# Patient Record
Sex: MALE | Race: WHITE | Employment: FULL TIME | URBAN - METROPOLITAN AREA
[De-identification: names, ages, dates, MRNs, and addresses within clinical notes are randomized per-mention and may not be internally consistent; named-entity substitution may affect disease eponyms.]

---

## 2017-02-10 ENCOUNTER — OFFICE VISIT (OUTPATIENT)
Dept: SLEEP MEDICINE | Age: 52
End: 2017-02-10

## 2017-02-10 VITALS
OXYGEN SATURATION: 98 % | HEIGHT: 74 IN | DIASTOLIC BLOOD PRESSURE: 78 MMHG | HEART RATE: 78 BPM | BODY MASS INDEX: 36.06 KG/M2 | SYSTOLIC BLOOD PRESSURE: 130 MMHG | WEIGHT: 281 LBS

## 2017-02-10 DIAGNOSIS — G47.33 OBSTRUCTIVE SLEEP APNEA SYNDROME: Primary | ICD-10-CM

## 2017-02-10 DIAGNOSIS — E66.9 NON MORBID OBESITY, UNSPECIFIED OBESITY TYPE: Chronic | ICD-10-CM

## 2017-02-10 DIAGNOSIS — I10 ESSENTIAL HYPERTENSION: Chronic | ICD-10-CM

## 2017-02-10 DIAGNOSIS — I48.0 PAROXYSMAL ATRIAL FIBRILLATION (HCC): Chronic | ICD-10-CM

## 2017-02-10 PROCEDURE — 99214 OFFICE O/P EST MOD 30 MIN: CPT | Performed by: NURSE PRACTITIONER

## 2017-02-10 ASSESSMENT — ENCOUNTER SYMPTOMS
RHINORRHEA: 0
SHORTNESS OF BREATH: 0
ABDOMINAL DISTENTION: 0
ABDOMINAL PAIN: 0
COUGH: 0
SINUS PRESSURE: 0
APNEA: 0

## 2017-02-10 ASSESSMENT — SLEEP AND FATIGUE QUESTIONNAIRES
HOW LIKELY ARE YOU TO NOD OFF OR FALL ASLEEP IN A CAR, WHILE STOPPED FOR A FEW MINUTES IN TRAFFIC: 0
ESS TOTAL SCORE: 5
HOW LIKELY ARE YOU TO NOD OFF OR FALL ASLEEP WHILE SITTING QUIETLY AFTER LUNCH WITHOUT ALCOHOL: 1
HOW LIKELY ARE YOU TO NOD OFF OR FALL ASLEEP WHILE LYING DOWN TO REST IN THE AFTERNOON WHEN CIRCUMSTANCES PERMIT: 1
HOW LIKELY ARE YOU TO NOD OFF OR FALL ASLEEP WHEN YOU ARE A PASSENGER IN A CAR FOR AN HOUR WITHOUT A BREAK: 1
HOW LIKELY ARE YOU TO NOD OFF OR FALL ASLEEP WHILE SITTING INACTIVE IN A PUBLIC PLACE: 0
HOW LIKELY ARE YOU TO NOD OFF OR FALL ASLEEP WHILE WATCHING TV: 1
HOW LIKELY ARE YOU TO NOD OFF OR FALL ASLEEP WHILE SITTING AND READING: 1
HOW LIKELY ARE YOU TO NOD OFF OR FALL ASLEEP WHILE SITTING AND TALKING TO SOMEONE: 0

## 2017-02-13 ENCOUNTER — OFFICE VISIT (OUTPATIENT)
Dept: CARDIOLOGY CLINIC | Age: 52
End: 2017-02-13

## 2017-02-13 VITALS
WEIGHT: 283.4 LBS | HEART RATE: 86 BPM | BODY MASS INDEX: 36.37 KG/M2 | OXYGEN SATURATION: 94 % | DIASTOLIC BLOOD PRESSURE: 70 MMHG | SYSTOLIC BLOOD PRESSURE: 128 MMHG | HEIGHT: 74 IN

## 2017-02-13 DIAGNOSIS — I48.0 PAROXYSMAL ATRIAL FIBRILLATION (HCC): Primary | Chronic | ICD-10-CM

## 2017-02-13 DIAGNOSIS — G47.33 OBSTRUCTIVE SLEEP APNEA SYNDROME: ICD-10-CM

## 2017-02-13 DIAGNOSIS — E66.09 OBESITY DUE TO EXCESS CALORIES, UNSPECIFIED OBESITY SEVERITY: ICD-10-CM

## 2017-02-13 DIAGNOSIS — I10 ESSENTIAL HYPERTENSION: ICD-10-CM

## 2017-02-13 PROCEDURE — 99214 OFFICE O/P EST MOD 30 MIN: CPT | Performed by: INTERNAL MEDICINE

## 2017-02-13 PROCEDURE — 93000 ELECTROCARDIOGRAM COMPLETE: CPT | Performed by: INTERNAL MEDICINE

## 2017-03-30 ENCOUNTER — OFFICE VISIT (OUTPATIENT)
Dept: INTERNAL MEDICINE CLINIC | Age: 52
End: 2017-03-30

## 2017-03-30 VITALS
BODY MASS INDEX: 36.32 KG/M2 | HEART RATE: 84 BPM | WEIGHT: 283 LBS | HEIGHT: 74 IN | DIASTOLIC BLOOD PRESSURE: 96 MMHG | SYSTOLIC BLOOD PRESSURE: 154 MMHG

## 2017-03-30 DIAGNOSIS — Z00.00 ROUTINE ADULT HEALTH MAINTENANCE: Primary | ICD-10-CM

## 2017-03-30 DIAGNOSIS — Z23 NEED FOR PROPHYLACTIC VACCINATION AGAINST DIPHTHERIA-TETANUS-PERTUSSIS (DTP): ICD-10-CM

## 2017-03-30 DIAGNOSIS — F41.1 GENERALIZED ANXIETY DISORDER: ICD-10-CM

## 2017-03-30 DIAGNOSIS — Z11.4 SCREENING FOR HIV WITHOUT PRESENCE OF RISK FACTORS: ICD-10-CM

## 2017-03-30 DIAGNOSIS — K63.5 POLYP OF COLON: ICD-10-CM

## 2017-03-30 DIAGNOSIS — I10 ESSENTIAL HYPERTENSION: ICD-10-CM

## 2017-03-30 DIAGNOSIS — Z11.59 NEED FOR HEPATITIS C SCREENING TEST: ICD-10-CM

## 2017-03-30 DIAGNOSIS — D22.9 NUMEROUS MOLES: ICD-10-CM

## 2017-03-30 DIAGNOSIS — G47.33 OBSTRUCTIVE SLEEP APNEA SYNDROME: ICD-10-CM

## 2017-03-30 PROCEDURE — 90471 IMMUNIZATION ADMIN: CPT | Performed by: INTERNAL MEDICINE

## 2017-03-30 PROCEDURE — 99386 PREV VISIT NEW AGE 40-64: CPT | Performed by: INTERNAL MEDICINE

## 2017-03-30 PROCEDURE — 90715 TDAP VACCINE 7 YRS/> IM: CPT | Performed by: INTERNAL MEDICINE

## 2017-03-30 RX ORDER — CITALOPRAM 20 MG/1
20 TABLET ORAL DAILY
Qty: 30 TABLET | Refills: 3 | Status: SHIPPED | OUTPATIENT
Start: 2017-03-30 | End: 2017-07-29 | Stop reason: SDUPTHER

## 2017-03-30 ASSESSMENT — ENCOUNTER SYMPTOMS
VOICE CHANGE: 0
WHEEZING: 0
COLOR CHANGE: 0
NAUSEA: 0
COUGH: 0
EYE PAIN: 0
BACK PAIN: 0
ABDOMINAL PAIN: 0
EYE DISCHARGE: 0
VOMITING: 0
RHINORRHEA: 0
SHORTNESS OF BREATH: 0

## 2017-03-31 DIAGNOSIS — Z00.00 ROUTINE ADULT HEALTH MAINTENANCE: ICD-10-CM

## 2017-03-31 DIAGNOSIS — Z11.59 NEED FOR HEPATITIS C SCREENING TEST: ICD-10-CM

## 2017-03-31 DIAGNOSIS — I10 ESSENTIAL HYPERTENSION: ICD-10-CM

## 2017-03-31 DIAGNOSIS — Z11.4 SCREENING FOR HIV WITHOUT PRESENCE OF RISK FACTORS: ICD-10-CM

## 2017-03-31 LAB
A/G RATIO: 2 (ref 1.1–2.2)
ALBUMIN SERPL-MCNC: 4.5 G/DL (ref 3.4–5)
ALP BLD-CCNC: 91 U/L (ref 40–129)
ALT SERPL-CCNC: 37 U/L (ref 10–40)
ANION GAP SERPL CALCULATED.3IONS-SCNC: 14 MMOL/L (ref 3–16)
AST SERPL-CCNC: 21 U/L (ref 15–37)
BILIRUB SERPL-MCNC: 0.5 MG/DL (ref 0–1)
BILIRUBIN URINE: NEGATIVE
BLOOD, URINE: NEGATIVE
BUN BLDV-MCNC: 17 MG/DL (ref 7–20)
CALCIUM SERPL-MCNC: 9.7 MG/DL (ref 8.3–10.6)
CHLORIDE BLD-SCNC: 103 MMOL/L (ref 99–110)
CHOLESTEROL, TOTAL: 217 MG/DL (ref 0–199)
CLARITY: CLEAR
CO2: 24 MMOL/L (ref 21–32)
COLOR: NORMAL
CREAT SERPL-MCNC: 1 MG/DL (ref 0.9–1.3)
GFR AFRICAN AMERICAN: >60
GFR NON-AFRICAN AMERICAN: >60
GLOBULIN: 2.2 G/DL
GLUCOSE BLD-MCNC: 91 MG/DL (ref 70–99)
GLUCOSE URINE: NEGATIVE MG/DL
HCT VFR BLD CALC: 42.7 % (ref 40.5–52.5)
HDLC SERPL-MCNC: 41 MG/DL (ref 40–60)
HEMOGLOBIN: 14.4 G/DL (ref 13.5–17.5)
HEPATITIS C ANTIBODY INTERPRETATION: NORMAL
KETONES, URINE: NEGATIVE MG/DL
LDL CHOLESTEROL CALCULATED: 145 MG/DL
LEUKOCYTE ESTERASE, URINE: NEGATIVE
MCH RBC QN AUTO: 29.3 PG (ref 26–34)
MCHC RBC AUTO-ENTMCNC: 33.6 G/DL (ref 31–36)
MCV RBC AUTO: 87.1 FL (ref 80–100)
MICROSCOPIC EXAMINATION: NORMAL
NITRITE, URINE: NEGATIVE
PDW BLD-RTO: 13.8 % (ref 12.4–15.4)
PH UA: 6
PLATELET # BLD: 240 K/UL (ref 135–450)
PMV BLD AUTO: 8.9 FL (ref 5–10.5)
POTASSIUM SERPL-SCNC: 4.3 MMOL/L (ref 3.5–5.1)
PROTEIN UA: NEGATIVE MG/DL
RBC # BLD: 4.91 M/UL (ref 4.2–5.9)
SODIUM BLD-SCNC: 141 MMOL/L (ref 136–145)
SPECIFIC GRAVITY UA: 1.02
TOTAL PROTEIN: 6.7 G/DL (ref 6.4–8.2)
TRIGL SERPL-MCNC: 153 MG/DL (ref 0–150)
TSH REFLEX: 1.99 UIU/ML (ref 0.27–4.2)
URINE TYPE: NORMAL
UROBILINOGEN, URINE: 0.2 E.U./DL
VLDLC SERPL CALC-MCNC: 31 MG/DL
WBC # BLD: 6.4 K/UL (ref 4–11)

## 2017-04-03 LAB — HIV-1 AND HIV-2 ANTIBODIES: NORMAL

## 2017-04-25 ENCOUNTER — OFFICE VISIT (OUTPATIENT)
Dept: INTERNAL MEDICINE CLINIC | Age: 52
End: 2017-04-25

## 2017-04-25 VITALS
HEART RATE: 80 BPM | HEIGHT: 74 IN | WEIGHT: 282.4 LBS | SYSTOLIC BLOOD PRESSURE: 138 MMHG | DIASTOLIC BLOOD PRESSURE: 84 MMHG | BODY MASS INDEX: 36.24 KG/M2

## 2017-04-25 DIAGNOSIS — K64.5 EXTERNAL HEMORRHOID, THROMBOSED: ICD-10-CM

## 2017-04-25 DIAGNOSIS — E78.2 MIXED HYPERLIPIDEMIA: ICD-10-CM

## 2017-04-25 DIAGNOSIS — Z12.5 SCREENING PSA (PROSTATE SPECIFIC ANTIGEN): ICD-10-CM

## 2017-04-25 DIAGNOSIS — I10 ESSENTIAL HYPERTENSION: Primary | ICD-10-CM

## 2017-04-25 PROCEDURE — 99214 OFFICE O/P EST MOD 30 MIN: CPT | Performed by: INTERNAL MEDICINE

## 2017-04-25 RX ORDER — ATORVASTATIN CALCIUM 10 MG/1
10 TABLET, FILM COATED ORAL DAILY
Qty: 30 TABLET | Refills: 3 | Status: SHIPPED | OUTPATIENT
Start: 2017-04-25 | End: 2017-08-25 | Stop reason: DRUGHIGH

## 2017-04-25 ASSESSMENT — ENCOUNTER SYMPTOMS
CONSTIPATION: 0
ABDOMINAL PAIN: 0
SHORTNESS OF BREATH: 0
NAUSEA: 0
VOMITING: 0
SORE THROAT: 0
WHEEZING: 0

## 2017-05-02 ENCOUNTER — OFFICE VISIT (OUTPATIENT)
Dept: SURGERY | Age: 52
End: 2017-05-02

## 2017-05-02 VITALS
BODY MASS INDEX: 36.57 KG/M2 | HEIGHT: 74 IN | DIASTOLIC BLOOD PRESSURE: 78 MMHG | SYSTOLIC BLOOD PRESSURE: 118 MMHG | WEIGHT: 285 LBS

## 2017-05-02 DIAGNOSIS — K64.5 EXTERNAL HEMORRHOID, THROMBOSED: Primary | ICD-10-CM

## 2017-05-02 PROCEDURE — 99203 OFFICE O/P NEW LOW 30 MIN: CPT | Performed by: SURGERY

## 2017-05-02 ASSESSMENT — ENCOUNTER SYMPTOMS
EYE REDNESS: 1
RECTAL PAIN: 1
BACK PAIN: 1
SINUS PRESSURE: 1
COLOR CHANGE: 0
EYE ITCHING: 1
APNEA: 1

## 2017-05-15 ENCOUNTER — OFFICE VISIT (OUTPATIENT)
Dept: SLEEP MEDICINE | Age: 52
End: 2017-05-15

## 2017-05-15 VITALS
BODY MASS INDEX: 36.19 KG/M2 | HEIGHT: 74 IN | DIASTOLIC BLOOD PRESSURE: 78 MMHG | SYSTOLIC BLOOD PRESSURE: 118 MMHG | OXYGEN SATURATION: 98 % | WEIGHT: 282 LBS | HEART RATE: 84 BPM

## 2017-05-15 DIAGNOSIS — E66.9 NON MORBID OBESITY, UNSPECIFIED OBESITY TYPE: Chronic | ICD-10-CM

## 2017-05-15 DIAGNOSIS — G47.33 OBSTRUCTIVE SLEEP APNEA SYNDROME: Primary | Chronic | ICD-10-CM

## 2017-05-15 DIAGNOSIS — I10 ESSENTIAL HYPERTENSION: Chronic | ICD-10-CM

## 2017-05-15 DIAGNOSIS — I48.0 PAROXYSMAL ATRIAL FIBRILLATION (HCC): Chronic | ICD-10-CM

## 2017-05-15 PROCEDURE — 99214 OFFICE O/P EST MOD 30 MIN: CPT | Performed by: NURSE PRACTITIONER

## 2017-05-15 ASSESSMENT — SLEEP AND FATIGUE QUESTIONNAIRES
HOW LIKELY ARE YOU TO NOD OFF OR FALL ASLEEP WHILE LYING DOWN TO REST IN THE AFTERNOON WHEN CIRCUMSTANCES PERMIT: 1
HOW LIKELY ARE YOU TO NOD OFF OR FALL ASLEEP WHILE SITTING AND READING: 2
HOW LIKELY ARE YOU TO NOD OFF OR FALL ASLEEP WHILE SITTING AND TALKING TO SOMEONE: 1
HOW LIKELY ARE YOU TO NOD OFF OR FALL ASLEEP WHILE WATCHING TV: 1
ESS TOTAL SCORE: 8
HOW LIKELY ARE YOU TO NOD OFF OR FALL ASLEEP IN A CAR, WHILE STOPPED FOR A FEW MINUTES IN TRAFFIC: 0
HOW LIKELY ARE YOU TO NOD OFF OR FALL ASLEEP WHILE SITTING QUIETLY AFTER LUNCH WITHOUT ALCOHOL: 1
HOW LIKELY ARE YOU TO NOD OFF OR FALL ASLEEP WHEN YOU ARE A PASSENGER IN A CAR FOR AN HOUR WITHOUT A BREAK: 1
HOW LIKELY ARE YOU TO NOD OFF OR FALL ASLEEP WHILE SITTING INACTIVE IN A PUBLIC PLACE: 1

## 2017-05-15 ASSESSMENT — ENCOUNTER SYMPTOMS
ABDOMINAL PAIN: 0
SINUS PRESSURE: 0
RHINORRHEA: 0
APNEA: 0
COUGH: 0
ABDOMINAL DISTENTION: 0
SHORTNESS OF BREATH: 0

## 2017-05-24 ENCOUNTER — HOSPITAL ENCOUNTER (OUTPATIENT)
Dept: ENDOSCOPY | Age: 52
Discharge: HOME | End: 2017-05-24
Attending: INTERNAL MEDICINE | Admitting: INTERNAL MEDICINE

## 2017-05-24 RX ORDER — LIDOCAINE HYDROCHLORIDE 10 MG/ML
1 INJECTION, SOLUTION EPIDURAL; INFILTRATION; INTRACAUDAL; PERINEURAL
Status: CANCELLED | OUTPATIENT
Start: 2017-05-24 | End: 2017-05-24

## 2017-05-24 RX ORDER — SODIUM CHLORIDE 0.9 % (FLUSH) 0.9 %
10 SYRINGE (ML) INJECTION PRN
Status: CANCELLED | OUTPATIENT
Start: 2017-05-24

## 2017-05-24 RX ORDER — ONDANSETRON 2 MG/ML
4 INJECTION INTRAMUSCULAR; INTRAVENOUS
Status: ACTIVE | OUTPATIENT
Start: 2017-05-24 | End: 2017-05-24

## 2017-05-24 RX ORDER — HYDROMORPHONE HCL 110MG/55ML
0.25 PATIENT CONTROLLED ANALGESIA SYRINGE INTRAVENOUS EVERY 5 MIN PRN
Status: DISCONTINUED | OUTPATIENT
Start: 2017-05-24 | End: 2017-05-25 | Stop reason: HOSPADM

## 2017-05-24 RX ORDER — SODIUM CHLORIDE 0.9 % (FLUSH) 0.9 %
10 SYRINGE (ML) INJECTION EVERY 12 HOURS SCHEDULED
Status: DISCONTINUED | OUTPATIENT
Start: 2017-05-24 | End: 2017-05-25 | Stop reason: HOSPADM

## 2017-05-24 RX ORDER — HYDROMORPHONE HCL 110MG/55ML
0.5 PATIENT CONTROLLED ANALGESIA SYRINGE INTRAVENOUS EVERY 5 MIN PRN
Status: DISCONTINUED | OUTPATIENT
Start: 2017-05-24 | End: 2017-05-25 | Stop reason: HOSPADM

## 2017-05-24 RX ORDER — OXYCODONE HYDROCHLORIDE AND ACETAMINOPHEN 5; 325 MG/1; MG/1
1 TABLET ORAL PRN
Status: ACTIVE | OUTPATIENT
Start: 2017-05-24 | End: 2017-05-24

## 2017-05-24 RX ORDER — OXYCODONE HYDROCHLORIDE AND ACETAMINOPHEN 5; 325 MG/1; MG/1
2 TABLET ORAL PRN
Status: ACTIVE | OUTPATIENT
Start: 2017-05-24 | End: 2017-05-24

## 2017-05-24 RX ORDER — SODIUM CHLORIDE 0.9 % (FLUSH) 0.9 %
10 SYRINGE (ML) INJECTION PRN
Status: DISCONTINUED | OUTPATIENT
Start: 2017-05-24 | End: 2017-05-25 | Stop reason: HOSPADM

## 2017-05-24 RX ORDER — FENTANYL CITRATE 50 UG/ML
50 INJECTION, SOLUTION INTRAMUSCULAR; INTRAVENOUS EVERY 5 MIN PRN
Status: DISCONTINUED | OUTPATIENT
Start: 2017-05-24 | End: 2017-05-25 | Stop reason: HOSPADM

## 2017-05-24 RX ORDER — MEPERIDINE HYDROCHLORIDE 25 MG/ML
12.5 INJECTION INTRAMUSCULAR; INTRAVENOUS; SUBCUTANEOUS EVERY 5 MIN PRN
Status: DISCONTINUED | OUTPATIENT
Start: 2017-05-24 | End: 2017-05-25 | Stop reason: HOSPADM

## 2017-05-24 RX ORDER — SODIUM CHLORIDE 0.9 % (FLUSH) 0.9 %
10 SYRINGE (ML) INJECTION EVERY 12 HOURS SCHEDULED
Status: CANCELLED | OUTPATIENT
Start: 2017-05-24

## 2017-05-24 RX ORDER — SODIUM CHLORIDE 9 MG/ML
INJECTION, SOLUTION INTRAVENOUS CONTINUOUS
Status: DISCONTINUED | OUTPATIENT
Start: 2017-05-24 | End: 2017-05-25 | Stop reason: HOSPADM

## 2017-05-24 RX ORDER — FENTANYL CITRATE 50 UG/ML
25 INJECTION, SOLUTION INTRAMUSCULAR; INTRAVENOUS EVERY 5 MIN PRN
Status: DISCONTINUED | OUTPATIENT
Start: 2017-05-24 | End: 2017-05-25 | Stop reason: HOSPADM

## 2017-05-24 RX ORDER — LABETALOL HYDROCHLORIDE 5 MG/ML
5 INJECTION, SOLUTION INTRAVENOUS EVERY 10 MIN PRN
Status: DISCONTINUED | OUTPATIENT
Start: 2017-05-24 | End: 2017-05-25 | Stop reason: HOSPADM

## 2017-05-24 RX ORDER — SODIUM CHLORIDE 9 MG/ML
INJECTION, SOLUTION INTRAVENOUS CONTINUOUS
Status: CANCELLED | OUTPATIENT
Start: 2017-05-24

## 2017-08-22 ENCOUNTER — OFFICE VISIT (OUTPATIENT)
Dept: INTERNAL MEDICINE CLINIC | Age: 52
End: 2017-08-22

## 2017-08-22 VITALS
HEIGHT: 74 IN | HEART RATE: 68 BPM | BODY MASS INDEX: 35.91 KG/M2 | WEIGHT: 279.8 LBS | DIASTOLIC BLOOD PRESSURE: 84 MMHG | SYSTOLIC BLOOD PRESSURE: 130 MMHG

## 2017-08-22 DIAGNOSIS — K58.9 IRRITABLE BOWEL SYNDROME, UNSPECIFIED TYPE: ICD-10-CM

## 2017-08-22 DIAGNOSIS — G47.33 OBSTRUCTIVE SLEEP APNEA SYNDROME: ICD-10-CM

## 2017-08-22 DIAGNOSIS — I47.1 SVT (SUPRAVENTRICULAR TACHYCARDIA) (HCC): Chronic | ICD-10-CM

## 2017-08-22 DIAGNOSIS — E78.2 MIXED HYPERLIPIDEMIA: ICD-10-CM

## 2017-08-22 DIAGNOSIS — Z12.5 SCREENING PSA (PROSTATE SPECIFIC ANTIGEN): ICD-10-CM

## 2017-08-22 DIAGNOSIS — I10 ESSENTIAL HYPERTENSION: Primary | ICD-10-CM

## 2017-08-22 DIAGNOSIS — F41.1 GENERALIZED ANXIETY DISORDER: ICD-10-CM

## 2017-08-22 PROCEDURE — 99214 OFFICE O/P EST MOD 30 MIN: CPT | Performed by: INTERNAL MEDICINE

## 2017-08-22 RX ORDER — VALSARTAN AND HYDROCHLOROTHIAZIDE 160; 12.5 MG/1; MG/1
1 TABLET, FILM COATED ORAL DAILY
Qty: 90 TABLET | Refills: 3 | Status: SHIPPED | OUTPATIENT
Start: 2017-08-22 | End: 2018-10-09 | Stop reason: SDUPTHER

## 2017-08-22 RX ORDER — DILTIAZEM HYDROCHLORIDE 120 MG/1
120 CAPSULE, EXTENDED RELEASE ORAL DAILY
Qty: 90 CAPSULE | Refills: 3 | Status: SHIPPED | OUTPATIENT
Start: 2017-08-22 | End: 2018-02-13 | Stop reason: SDUPTHER

## 2017-08-22 ASSESSMENT — ENCOUNTER SYMPTOMS
NAUSEA: 0
CONSTIPATION: 0
VOMITING: 0
SHORTNESS OF BREATH: 0
ABDOMINAL PAIN: 0
WHEEZING: 0
SORE THROAT: 0

## 2017-08-22 ASSESSMENT — PATIENT HEALTH QUESTIONNAIRE - PHQ9
2. FEELING DOWN, DEPRESSED OR HOPELESS: 0
1. LITTLE INTEREST OR PLEASURE IN DOING THINGS: 1
SUM OF ALL RESPONSES TO PHQ QUESTIONS 1-9: 1
SUM OF ALL RESPONSES TO PHQ9 QUESTIONS 1 & 2: 1

## 2017-08-23 DIAGNOSIS — K58.9 IRRITABLE BOWEL SYNDROME, UNSPECIFIED TYPE: ICD-10-CM

## 2017-08-23 LAB
AST SERPL-CCNC: 16 U/L (ref 15–37)
CHOLESTEROL, TOTAL: 220 MG/DL (ref 0–199)
HDLC SERPL-MCNC: 37 MG/DL (ref 40–60)
LDL CHOLESTEROL CALCULATED: 145 MG/DL
PROSTATE SPECIFIC ANTIGEN: 0.84 NG/ML (ref 0–4)
TRIGL SERPL-MCNC: 190 MG/DL (ref 0–150)
VLDLC SERPL CALC-MCNC: 38 MG/DL

## 2017-08-25 LAB
GLIADIN ANTIBODIES IGA: 3 UNITS (ref 0–19)
GLIADIN ANTIBODIES IGG: 4 UNITS (ref 0–19)
TISSUE TRANSGLUTAMINASE IGA: 0 U/ML (ref 0–3)

## 2017-08-25 RX ORDER — ATORVASTATIN CALCIUM 40 MG/1
40 TABLET, FILM COATED ORAL DAILY
Qty: 30 TABLET | Refills: 5 | Status: SHIPPED | OUTPATIENT
Start: 2017-08-25 | End: 2017-12-11

## 2017-09-27 ENCOUNTER — TELEPHONE (OUTPATIENT)
Dept: INTERNAL MEDICINE CLINIC | Age: 52
End: 2017-09-27

## 2017-09-27 DIAGNOSIS — K58.9 IRRITABLE BOWEL SYNDROME, UNSPECIFIED TYPE: Primary | ICD-10-CM

## 2017-09-27 RX ORDER — DICYCLOMINE HYDROCHLORIDE 10 MG/1
10 CAPSULE ORAL 4 TIMES DAILY
Qty: 120 CAPSULE | Refills: 2 | Status: SHIPPED | OUTPATIENT
Start: 2017-09-27 | End: 2018-02-23 | Stop reason: SDUPTHER

## 2017-10-02 ENCOUNTER — OFFICE VISIT (OUTPATIENT)
Dept: DERMATOLOGY | Age: 52
End: 2017-10-02

## 2017-10-02 DIAGNOSIS — D48.5 NEOPLASM OF UNCERTAIN BEHAVIOR OF SKIN: Primary | ICD-10-CM

## 2017-10-02 DIAGNOSIS — D22.9 BENIGN NEVUS: ICD-10-CM

## 2017-10-02 DIAGNOSIS — L57.0 AK (ACTINIC KERATOSIS): ICD-10-CM

## 2017-10-02 DIAGNOSIS — L80 VITILIGO: ICD-10-CM

## 2017-10-02 PROCEDURE — 17003 DESTRUCT PREMALG LES 2-14: CPT | Performed by: DERMATOLOGY

## 2017-10-02 PROCEDURE — 11100 PR BIOPSY OF SKIN LESION: CPT | Performed by: DERMATOLOGY

## 2017-10-02 PROCEDURE — 99242 OFF/OP CONSLTJ NEW/EST SF 20: CPT | Performed by: DERMATOLOGY

## 2017-10-02 PROCEDURE — 17000 DESTRUCT PREMALG LESION: CPT | Performed by: DERMATOLOGY

## 2017-10-02 NOTE — MR AVS SNAPSHOT
After Visit Summary             Gretel Babinski   10/2/2017 12:00 PM   Office Visit    Description:  Male : 1965   Provider:  Arvind Jewell MD   Department:  [facility]              Your Follow-Up and Future Appointments         Below is a list of your follow-up and future appointments. This may not be a complete list as you may have made appointments directly with providers that we are not aware of or your providers may have made some for you. Please call your providers to confirm appointments. It is important to keep your appointments. Please bring your current insurance card, photo ID, co-pay, and all medication bottles to your appointment. If self-pay, payment is expected at the time of service. Your To-Do List     Future Appointments Provider Department Dept Phone    2018 8:00 AM DANIEL Ruggiero MD [facility] Medicine [phone]    Please arrive 15 minutes prior to appointment, bring photo ID and insurance card. 3/19/2018 3:15 PM Arvind Jewell MD [facility] [phone]    Please arrive 15 minutes prior to appointment, bring photo ID and insurance card. 2018 10:00 AM Kandi Ambriz, [address] [facility],[address] Sleep Medicine [phone]    Please arrive 15 minutes prior to appointment, bring photo ID and insurance card. Follow-Up    Return in about 6 months (around 2018) for recheck. Information from Your Visit        Department     Name Address Phone Fax    [facility] [address] [phone]      You Were Seen for:         Comments    Neoplasm of uncertain behavior of skin   [238. 2. ICD-9-CM]         Vital Signs     Smoking Status                   Former Smoker           Additional Information about your Body Mass Index (BMI)           Your BMI as listed above is considered obese (30 or more).  BMI is an Obstructive sleep apnea syndrome    Chest pain of uncertain etiology    A-fib (HCC) (Chronic)    SVT (supraventricular tachycardia) (HCC) (Chronic)    Palpitations    Hypertension      Immunizations as of 10/2/2017     Name Date    Influenza, Ayo Zapata, 3 Years and older, IM 10/10/2016    Tdap (Boostrix, Adacel) 3/30/2017      Preventive Care        Date Due    Yearly Flu Vaccine (1) 9/1/2017    Cholesterol Screening 8/23/2022    Tetanus Combination Vaccine (2 - Td) 3/30/2027    Colonoscopy 5/24/2027            [employer] Signup           Our records indicate that you have an active [employer] account. You can view your After Visit Summary by going to [email or web address] and logging in with your [employer] username and password. If you don't have a [employer] username and password but a parent or guardian has access to your record, the parent or guardian should login with their own [employer] username and password and access your record to view the After Visit Summary. Additional Information  If you have questions, please contact the physician practice where you receive care. Remember, [employer] is NOT to be used for urgent needs. For medical emergencies, dial 911. For questions regarding your [employer] account call [phone]. If you have a clinical question, please call your doctor's office.

## 2017-10-05 ENCOUNTER — TELEPHONE (OUTPATIENT)
Dept: DERMATOLOGY | Age: 52
End: 2017-10-05

## 2017-10-05 DIAGNOSIS — C44.91 BCC (BASAL CELL CARCINOMA): Primary | ICD-10-CM

## 2017-10-05 NOTE — TELEPHONE ENCOUNTER
Reviewed results of the biopsy with the patient. Referral sent to Dr. Keke Mora    The patient expressed understanding of the plan.

## 2017-12-01 ENCOUNTER — TELEPHONE (OUTPATIENT)
Dept: CARDIOLOGY CLINIC | Age: 52
End: 2017-12-01

## 2017-12-01 NOTE — TELEPHONE ENCOUNTER
Message printed and faxed to [facility] for immediate f/u. Original request faxed to them 11-6-17.

## 2017-12-01 NOTE — TELEPHONE ENCOUNTER
Pt called and a request for medical records was sent in so pt can obtain life insurance. They have never received the medical records requested. Last ov and ablation results are needed. And anything else that would be of benefit for the insurance company to know.     Please fax: [phone]    This is needed as soon as possible as first request seems to have been lost.    Thank you [facility]

## 2017-12-20 ENCOUNTER — PROCEDURE VISIT (OUTPATIENT)
Dept: SURGERY | Age: 52
End: 2017-12-20

## 2017-12-20 VITALS
TEMPERATURE: 97.8 F | HEART RATE: 79 BPM | SYSTOLIC BLOOD PRESSURE: 134 MMHG | BODY MASS INDEX: 37.49 KG/M2 | WEIGHT: 292 LBS | DIASTOLIC BLOOD PRESSURE: 86 MMHG | OXYGEN SATURATION: 96 %

## 2017-12-20 DIAGNOSIS — C44.319 BASAL CELL CARCINOMA OF LEFT TEMPLE REGION: Primary | ICD-10-CM

## 2017-12-20 PROCEDURE — 12051 INTMD RPR FACE/MM 2.5 CM/<: CPT | Performed by: DERMATOLOGY

## 2017-12-20 PROCEDURE — 17311 MOHS 1 STAGE H/N/HF/G: CPT | Performed by: DERMATOLOGY

## 2017-12-20 NOTE — PATIENT INSTRUCTIONS
[facility] Office Hours:    Monday-Thursday  7:30 AM-4:30 PM    Friday  9:00 AM-3:00 PM    The patient was given post operative care instructions for dissolving sutures with skin adhesive. All questions answered.

## 2017-12-20 NOTE — PROGRESS NOTES
condition. FOLLOW-UP:  As dissolving sutures were placed, the patient was asked to return if any questions or concerns arose, but otherwise will return to see general dermatology per their instructions.

## 2017-12-21 ENCOUNTER — TELEPHONE (OUTPATIENT)
Dept: SURGERY | Age: 52
End: 2017-12-21

## 2018-02-13 ENCOUNTER — OFFICE VISIT (OUTPATIENT)
Dept: CARDIOLOGY CLINIC | Age: 53
End: 2018-02-13

## 2018-02-13 VITALS
HEART RATE: 78 BPM | WEIGHT: 294.4 LBS | HEIGHT: 74 IN | DIASTOLIC BLOOD PRESSURE: 86 MMHG | OXYGEN SATURATION: 95 % | BODY MASS INDEX: 37.78 KG/M2 | SYSTOLIC BLOOD PRESSURE: 134 MMHG

## 2018-02-13 DIAGNOSIS — E66.09 CLASS 2 OBESITY DUE TO EXCESS CALORIES WITH BODY MASS INDEX (BMI) OF 36.0 TO 36.9 IN ADULT, UNSPECIFIED WHETHER SERIOUS COMORBIDITY PRESENT: ICD-10-CM

## 2018-02-13 DIAGNOSIS — I47.1 SVT (SUPRAVENTRICULAR TACHYCARDIA) (HCC): Chronic | ICD-10-CM

## 2018-02-13 DIAGNOSIS — I48.0 PAROXYSMAL ATRIAL FIBRILLATION (HCC): Primary | Chronic | ICD-10-CM

## 2018-02-13 DIAGNOSIS — I10 ESSENTIAL HYPERTENSION: ICD-10-CM

## 2018-02-13 DIAGNOSIS — G47.33 OBSTRUCTIVE SLEEP APNEA SYNDROME: ICD-10-CM

## 2018-02-13 PROCEDURE — 99214 OFFICE O/P EST MOD 30 MIN: CPT | Performed by: INTERNAL MEDICINE

## 2018-02-13 PROCEDURE — 93000 ELECTROCARDIOGRAM COMPLETE: CPT | Performed by: INTERNAL MEDICINE

## 2018-02-13 RX ORDER — DILTIAZEM HYDROCHLORIDE 90 MG/1
180 CAPSULE, EXTENDED RELEASE ORAL DAILY
Qty: 180 CAPSULE | Refills: 3 | Status: SHIPPED | OUTPATIENT
Start: 2018-02-13 | End: 2019-04-12

## 2018-02-13 NOTE — PROGRESS NOTES
tablet by mouth daily 90 tablet 3    Misc Natural Products (NF FORMULAS TESTOSTERONE PO) Take by mouth      BiPAP Machine MISC by Does not apply route      aspirin  MG EC tablet Take 1 tablet by mouth daily      [DISCONTINUED] atorvastatin (LIPITOR) 10 MG tablet TAKE 1 TABLET BY MOUTH DAILY 90 tablet 1    dicyclomine (BENTYL) 10 MG capsule Take 1 capsule by mouth 4 times daily 120 capsule 2    [DISCONTINUED] diltiazem (CARDIZEM 12 HR) 120 MG extended release capsule Take 1 capsule by mouth daily 90 capsule 3    [DISCONTINUED] citalopram (CELEXA) 20 MG tablet TAKE ONE TABLET BY MOUTH ONCE DAILY 30 tablet 5    hydrocortisone (ANUSOL-HC) 2.5 % rectal cream Place rectally 2 times daily. 1 Tube 1     No facility-administered encounter medications on file as of 2/13/2018. Allergies:  Review of patient's allergies indicates no known allergies. Review of System:  All other systems reviewed and are negative except for that noted above. Pertinent negatives are:     · General: negative for fever, chills   · Ophthalmic ROS: negative for - eye pain or loss of vision  · ENT ROS: negative for - headaches, sore throat   · Respiratory: negative for - cough, sputum  · Cardiovascular: Reviewed in HPI  · Gastrointestinal: negative for - abdominal pain, diarrhea, N/V  · Hematology: negative for - bleeding, blood clots, bruising or jaundice  · Genito-Urinary:  negative for - Dysuria or incontinence  · Musculoskeletal: negative for - Joint swelling, muscle pain  · Neurological: negative for - confusion, dizziness, headaches   · Psychiatric: No anxiety, no depression. · Dermatological: negative for - rash    Vitals:    02/13/18 0830   BP: 134/86   Pulse: 78   SpO2: 95%       Wt Readings from Last 3 Encounters:   02/13/18 294 lb 6.4 oz (133.5 kg)   12/20/17 292 lb (132.5 kg)   08/22/17 279 lb 12.8 oz (126.9 kg)     · Constitutional: Oriented. No distress. · Head: Normocephalic and atraumatic.    · Mouth/Throat: drinks as this may exacerbated ectopy and arrhythmia. - The patient is counseled to get regular exercise 3-5 times per week to control cardiovascular risk factors. F/u as needed    I, Dr. Antonella Hidalgo personally performed the services described in this documentation as scribed by Margot Moses RN in my presence, and it is both accurate and complete.       Antonella Hidalgo MD, MPH  [place] [address]   Office: [phone]

## 2018-02-13 NOTE — PATIENT INSTRUCTIONS
Patient Education        Heart-Healthy Diet: Care Instructions  Your Care Instructions    A heart-healthy diet has lots of vegetables, fruits, nuts, beans, and whole grains, and is low in salt. It limits foods that are high in saturated fat, such as meats, cheeses, and fried foods. It may be hard to change your diet, but even small changes can lower your risk of heart attack and heart disease. Follow-up care is a key part of your treatment and safety. Be sure to make and go to all appointments, and call your doctor if you are having problems. It's also a good idea to know your test results and keep a list of the medicines you take. How can you care for yourself at home? Watch your portions  · Learn what a serving is. A \"serving\" and a \"portion\" are not always the same thing. Make sure that you are not eating larger portions than are recommended. For example, a serving of pasta is ½ cup. A serving size of meat is 2 to 3 ounces. A 3-ounce serving is about the size of a deck of cards. Measure serving sizes until you are good at Ryan" them. Keep in mind that restaurants often serve portions that are 2 or 3 times the size of one serving. · To keep your energy level up and keep you from feeling hungry, eat often but in smaller portions. · Eat only the number of calories you need to stay at a healthy weight. If you need to lose weight, eat fewer calories than your body burns (through exercise and other physical activity). Eat more fruits and vegetables  · Eat a variety of fruit and vegetables every day. Dark green, deep orange, red, or yellow fruits and vegetables are especially good for you. Examples include spinach, carrots, peaches, and berries. · Keep carrots, celery, and other veggies handy for snacks. Buy fruit that is in season and store it where you can see it so that you will be tempted to eat it. · Cook dishes that have a lot of veggies in them, such as stir-fries and soups.   Limit saturated and

## 2018-02-23 ENCOUNTER — OFFICE VISIT (OUTPATIENT)
Dept: INTERNAL MEDICINE CLINIC | Age: 53
End: 2018-02-23

## 2018-02-23 VITALS
HEART RATE: 72 BPM | HEIGHT: 74 IN | SYSTOLIC BLOOD PRESSURE: 138 MMHG | WEIGHT: 298 LBS | DIASTOLIC BLOOD PRESSURE: 88 MMHG | BODY MASS INDEX: 38.24 KG/M2

## 2018-02-23 DIAGNOSIS — I10 ESSENTIAL HYPERTENSION: ICD-10-CM

## 2018-02-23 DIAGNOSIS — F41.1 GENERALIZED ANXIETY DISORDER: Primary | ICD-10-CM

## 2018-02-23 DIAGNOSIS — K58.9 IRRITABLE BOWEL SYNDROME, UNSPECIFIED TYPE: ICD-10-CM

## 2018-02-23 DIAGNOSIS — E78.2 MIXED HYPERLIPIDEMIA: ICD-10-CM

## 2018-02-23 PROCEDURE — 99214 OFFICE O/P EST MOD 30 MIN: CPT | Performed by: INTERNAL MEDICINE

## 2018-02-23 RX ORDER — CITALOPRAM 20 MG/1
TABLET ORAL
Qty: 90 TABLET | Refills: 3 | Status: SHIPPED | OUTPATIENT
Start: 2018-02-23 | End: 2018-08-17 | Stop reason: SDUPTHER

## 2018-02-23 RX ORDER — DICYCLOMINE HYDROCHLORIDE 10 MG/1
10 CAPSULE ORAL 4 TIMES DAILY PRN
Qty: 120 CAPSULE | Refills: 1 | Status: ON HOLD | OUTPATIENT
Start: 2018-02-23 | End: 2019-12-09 | Stop reason: HOSPADM

## 2018-02-23 ASSESSMENT — ENCOUNTER SYMPTOMS
WHEEZING: 0
COUGH: 0
NAUSEA: 0
ABDOMINAL PAIN: 0
PHOTOPHOBIA: 0
VOMITING: 0
SHORTNESS OF BREATH: 0

## 2018-02-23 NOTE — PROGRESS NOTES
Dwayne Oliver  1965  male  46 y.o. SUBJECTIVE:    Chief Complaint   Patient presents with    6 Month Follow-Up    Hypertension       HPI:  Hypertension:    Dwayne Oliver returns for follow up of hypertension. Tolerating medications well and taking them as directed. Does  check BP at home. No symptoms (denies chest pain,dyspnea,edema or TIA's or blurred vision) concerning for end organ damage are present. MICKIE:    Patient is compliant with Bipap. Currently has no fatigue. Denies any daytime drowsiness or sleepiness. His generalized anxiety symptoms has been well controlled with current Celexa. He denies any suicidal or homicidal ideation. Status post colonoscopy. Multiple polyps removal.  Next colonoscopy in 3 years. He is still needs to take as needed Bentyl for irritable bowel syndrome. Patient has not been strictly following diet for hypercholesterolemia. Past Medical History:   Diagnosis Date    Atrial fibrillation ([address]     Basal cell carcinoma of left temple region 12/20/2017    GERD (gastroesophageal reflux disease)     Hypertension     Mixed hyperlipidemia 4/25/2017    Obstructive sleep apnea (adult) (pediatric)      Past Surgical History:   Procedure Laterality Date    ABLATION OF DYSRHYTHMIC FOCUS  10/2015    COLON SURGERY      COLONOSCOPY  01/01/2011    COLONOSCOPY  05/24/2017    Dr. Buzz Feliz multiple polyps and the ascending colon and transverse colon. Status post multiple polypectomies and biopsies.     EYE SURGERY      lasik procedure    MOHS SURGERY  12/20/2017    Left temple    TONSILLECTOMY  1970     Social History     Social History    Marital status:      Spouse name: Song Morse Number of children: 2    Years of education: N/A     Occupational History    Liscenced Counselor      [employer]     Social History Main Topics    Smoking status: Former Smoker     Types: Cigars    Smokeless tobacco: Never Used      Comment: cigars occas in past    Alcohol use No HGB 14.4 03/31/2017    HCT 42.7 03/31/2017     03/31/2017     CMP:   Lab Results   Component Value Date     03/31/2017    K 4.3 03/31/2017     03/31/2017    CO2 24 03/31/2017    ANIONGAP 14 03/31/2017    GLUCOSE 91 03/31/2017    BUN 17 03/31/2017    CREATININE 1.0 03/31/2017    GFRAA >60 03/31/2017    CALCIUM 9.7 03/31/2017    PROT 6.7 03/31/2017    LABALBU 4.5 03/31/2017    AGRATIO 2.0 03/31/2017    BILITOT 0.5 03/31/2017    ALKPHOS 91 03/31/2017    ALT 37 03/31/2017    AST 16 08/23/2017    GLOB 2.2 03/31/2017     URINALYSIS:   Lab Results   Component Value Date    GLUCOSEU Negative 03/31/2017    KETUA Negative 03/31/2017    SPECGRAV 1.023 03/31/2017    BLOODU Negative 03/31/2017    PHUR 6.0 03/31/2017    PROTEINU Negative 03/31/2017    NITRU Negative 03/31/2017    LEUKOCYTESUR Negative 03/31/2017    LABMICR Not Indicated 03/31/2017    URINETYPE Clean catch 03/31/2017     MICRO/ALB:   Lab Results   Component Value Date    LABMICR Not Indicated 03/31/2017     LIPID:   Lab Results   Component Value Date    CHOL 220 08/23/2017    TRIG 190 08/23/2017    HDL 37 08/23/2017    LDLCALC 145 08/23/2017    LABVLDL 38 08/23/2017     TSH:   Lab Results   Component Value Date    TSHREFLEX 1.99 03/31/2017     PSA:   Lab Results   Component Value Date    PSA 0.84 08/23/2017        ASSESSMENT/PLAN:    Annabelle Easton was seen today for 6 month follow-up and hypertension. Diagnoses and all orders for this visit:    Generalized anxiety disorder  -     citalopram (CELEXA) 20 MG tablet; TAKE ONE TABLET BY MOUTH ONCE DAILY  Continue current medication. No medication side effects  Irritable bowel syndrome, unspecified type  As needed-     dicyclomine (BENTYL) 10 MG capsule; Take 1 capsule by mouth 4 times daily as needed (cramping)    Essential hypertension-stable and well controlled. History of supraventricular Arrhythmia status post ablation. Patient no longer have palpitation dizziness.   Continue Cardizem and Diovan

## 2018-04-04 ENCOUNTER — NURSE TRIAGE (OUTPATIENT)
Dept: OTHER | Facility: CLINIC | Age: 53
End: 2018-04-04

## 2018-04-04 ENCOUNTER — NURSE TRIAGE (OUTPATIENT)
Dept: ONCOLOGY | Age: 53
End: 2018-04-04

## 2018-04-30 ENCOUNTER — TELEPHONE (OUTPATIENT)
Dept: INTERNAL MEDICINE CLINIC | Age: 53
End: 2018-04-30

## 2018-05-03 ENCOUNTER — OFFICE VISIT (OUTPATIENT)
Dept: INTERNAL MEDICINE CLINIC | Age: 53
End: 2018-05-03

## 2018-05-03 VITALS — HEART RATE: 72 BPM | DIASTOLIC BLOOD PRESSURE: 78 MMHG | SYSTOLIC BLOOD PRESSURE: 130 MMHG

## 2018-05-03 DIAGNOSIS — J30.1 ACUTE SEASONAL ALLERGIC RHINITIS DUE TO POLLEN: Primary | ICD-10-CM

## 2018-05-03 DIAGNOSIS — H10.13 ALLERGIC CONJUNCTIVITIS OF BOTH EYES: ICD-10-CM

## 2018-05-03 PROCEDURE — 96372 THER/PROPH/DIAG INJ SC/IM: CPT | Performed by: INTERNAL MEDICINE

## 2018-05-03 PROCEDURE — 99213 OFFICE O/P EST LOW 20 MIN: CPT | Performed by: INTERNAL MEDICINE

## 2018-05-03 RX ORDER — PREDNISONE 20 MG/1
50 TABLET ORAL DAILY
Qty: 15 TABLET | Refills: 0 | Status: SHIPPED | OUTPATIENT
Start: 2018-05-03 | End: 2018-05-09

## 2018-05-03 RX ORDER — OLOPATADINE HYDROCHLORIDE 1 MG/ML
1 SOLUTION/ DROPS OPHTHALMIC 2 TIMES DAILY
Qty: 1 BOTTLE | Refills: 0 | Status: SHIPPED | OUTPATIENT
Start: 2018-05-03 | End: 2018-05-13

## 2018-05-03 RX ORDER — METHYLPREDNISOLONE SODIUM SUCCINATE 125 MG/2ML
125 INJECTION, POWDER, LYOPHILIZED, FOR SOLUTION INTRAMUSCULAR; INTRAVENOUS ONCE
Status: COMPLETED | OUTPATIENT
Start: 2018-05-03 | End: 2018-05-03

## 2018-05-03 RX ADMIN — METHYLPREDNISOLONE SODIUM SUCCINATE 125 MG: 125 INJECTION, POWDER, LYOPHILIZED, FOR SOLUTION INTRAMUSCULAR; INTRAVENOUS at 13:24

## 2018-05-03 ASSESSMENT — ENCOUNTER SYMPTOMS
DIFFICULTY BREATHING: 0
SINUS PRESSURE: 0
DIARRHEA: 0
WHEEZING: 0
FACIAL SWELLING: 0
SORE THROAT: 0
ABDOMINAL PAIN: 0
ALLERGIC REACTION: 1
TROUBLE SWALLOWING: 0
EYE ITCHING: 1
VOMITING: 0
EYE REDNESS: 1
HYPERVENTILATION: 0
GLOBUS SENSATION: 0
RHINORRHEA: 1
EYE WATERING: 1
COUGH: 1
VOICE CHANGE: 0

## 2018-05-25 ENCOUNTER — EMPLOYEE WELLNESS (OUTPATIENT)
Dept: OTHER | Age: 53
End: 2018-05-25

## 2018-05-25 LAB
CHOLESTEROL, TOTAL: 216 MG/DL (ref 0–199)
GLUCOSE BLD-MCNC: 102 MG/DL (ref 70–99)
HDLC SERPL-MCNC: 44 MG/DL (ref 40–60)
LDL CHOLESTEROL CALCULATED: 145 MG/DL
TRIGL SERPL-MCNC: 136 MG/DL (ref 0–150)

## 2018-05-29 VITALS — WEIGHT: 307 LBS | BODY MASS INDEX: 39.42 KG/M2

## 2018-08-17 DIAGNOSIS — F41.1 GENERALIZED ANXIETY DISORDER: ICD-10-CM

## 2018-08-17 RX ORDER — CITALOPRAM 20 MG/1
TABLET ORAL
Qty: 90 TABLET | Refills: 0 | Status: SHIPPED | OUTPATIENT
Start: 2018-08-17 | End: 2018-08-24 | Stop reason: DRUGHIGH

## 2018-08-24 ENCOUNTER — OFFICE VISIT (OUTPATIENT)
Dept: INTERNAL MEDICINE CLINIC | Age: 53
End: 2018-08-24

## 2018-08-24 VITALS
HEIGHT: 74 IN | DIASTOLIC BLOOD PRESSURE: 88 MMHG | BODY MASS INDEX: 40.3 KG/M2 | SYSTOLIC BLOOD PRESSURE: 128 MMHG | WEIGHT: 314 LBS | HEART RATE: 78 BPM

## 2018-08-24 DIAGNOSIS — Z12.5 SCREENING PSA (PROSTATE SPECIFIC ANTIGEN): ICD-10-CM

## 2018-08-24 DIAGNOSIS — E78.2 MIXED HYPERLIPIDEMIA: ICD-10-CM

## 2018-08-24 DIAGNOSIS — R73.9 HYPERGLYCEMIA: ICD-10-CM

## 2018-08-24 DIAGNOSIS — F41.1 GENERALIZED ANXIETY DISORDER: ICD-10-CM

## 2018-08-24 DIAGNOSIS — I10 ESSENTIAL HYPERTENSION: Primary | ICD-10-CM

## 2018-08-24 DIAGNOSIS — I10 ESSENTIAL HYPERTENSION: ICD-10-CM

## 2018-08-24 LAB
ANION GAP SERPL CALCULATED.3IONS-SCNC: 12 MMOL/L (ref 3–16)
BUN BLDV-MCNC: 15 MG/DL (ref 7–20)
CALCIUM SERPL-MCNC: 9.7 MG/DL (ref 8.3–10.6)
CHLORIDE BLD-SCNC: 102 MMOL/L (ref 99–110)
CO2: 27 MMOL/L (ref 21–32)
CREAT SERPL-MCNC: 1.1 MG/DL (ref 0.9–1.3)
GFR AFRICAN AMERICAN: >60
GFR NON-AFRICAN AMERICAN: >60
GLUCOSE BLD-MCNC: 105 MG/DL (ref 70–99)
POTASSIUM SERPL-SCNC: 4.3 MMOL/L (ref 3.5–5.1)
PROSTATE SPECIFIC ANTIGEN: 0.83 NG/ML (ref 0–4)
SODIUM BLD-SCNC: 141 MMOL/L (ref 136–145)

## 2018-08-24 PROCEDURE — 99214 OFFICE O/P EST MOD 30 MIN: CPT | Performed by: INTERNAL MEDICINE

## 2018-08-24 PROCEDURE — G0444 DEPRESSION SCREEN ANNUAL: HCPCS | Performed by: INTERNAL MEDICINE

## 2018-08-24 RX ORDER — CITALOPRAM 10 MG/1
10 TABLET ORAL DAILY
Qty: 90 TABLET | Refills: 1 | Status: SHIPPED | OUTPATIENT
Start: 2018-08-24 | End: 2019-03-31 | Stop reason: SDUPTHER

## 2018-08-24 ASSESSMENT — PATIENT HEALTH QUESTIONNAIRE - PHQ9
SUM OF ALL RESPONSES TO PHQ QUESTIONS 1-9: 3
2. FEELING DOWN, DEPRESSED OR HOPELESS: 1
SUM OF ALL RESPONSES TO PHQ QUESTIONS 1-9: 3
4. FEELING TIRED OR HAVING LITTLE ENERGY: 1
5. POOR APPETITE OR OVEREATING: 1
3. TROUBLE FALLING OR STAYING ASLEEP: 0
SUM OF ALL RESPONSES TO PHQ9 QUESTIONS 1 & 2: 1
8. MOVING OR SPEAKING SO SLOWLY THAT OTHER PEOPLE COULD HAVE NOTICED. OR THE OPPOSITE, BEING SO FIGETY OR RESTLESS THAT YOU HAVE BEEN MOVING AROUND A LOT MORE THAN USUAL: 0
9. THOUGHTS THAT YOU WOULD BE BETTER OFF DEAD, OR OF HURTING YOURSELF: 0
7. TROUBLE CONCENTRATING ON THINGS, SUCH AS READING THE NEWSPAPER OR WATCHING TELEVISION: 0
6. FEELING BAD ABOUT YOURSELF - OR THAT YOU ARE A FAILURE OR HAVE LET YOURSELF OR YOUR FAMILY DOWN: 0
10. IF YOU CHECKED OFF ANY PROBLEMS, HOW DIFFICULT HAVE THESE PROBLEMS MADE IT FOR YOU TO DO YOUR WORK, TAKE CARE OF THINGS AT HOME, OR GET ALONG WITH OTHER PEOPLE: 0
1. LITTLE INTEREST OR PLEASURE IN DOING THINGS: 0

## 2018-08-24 ASSESSMENT — ENCOUNTER SYMPTOMS
ABDOMINAL PAIN: 0
PHOTOPHOBIA: 0
NAUSEA: 0
WHEEZING: 0
VOMITING: 0

## 2018-08-24 NOTE — PROGRESS NOTES
Dominic Sanchez  1965  male  46 y.o. SUBJECTIVE:    Chief Complaint   Patient presents with    6 Month Follow-Up    Depression     off citalopram for 1 week--ok to stay off? HPI:  Hypertension:    Dominic Sanchez returns for follow up of hypertension. Tolerating medications well and taking them as directed. Does not check BP at home. No symptoms (denies chest pain,dyspnea,edema or TIA's or blurred vision) concerning for end organ damage are present. History of generalized anxiety disorder. Last use of Celexa 7 days ago. He did not notice any recurrence of generalized anxiety symptoms. Currently his wife going through illness. He had lab done by his employer. Elevated blood glucose and elevated cholesterol. Past Medical History:   Diagnosis Date    Atrial fibrillation ([address]     Basal cell carcinoma of left temple region 12/20/2017    GERD (gastroesophageal reflux disease)     Hypertension     Mixed hyperlipidemia 4/25/2017    Obstructive sleep apnea (adult) (pediatric)      Past Surgical History:   Procedure Laterality Date    ABLATION OF DYSRHYTHMIC FOCUS  10/2015    COLON SURGERY      COLONOSCOPY  01/01/2011    COLONOSCOPY  05/24/2017    Dr. Willard Orozco multiple polyps and the ascending colon and transverse colon. Status post multiple polypectomies and biopsies.     EYE SURGERY      lasik procedure    MOHS SURGERY  12/20/2017    Left temple    TONSILLECTOMY  1970     Social History     Social History    Marital status:      Spouse name: Marc Amanda Number of children: 2    Years of education: N/A     Occupational History    Liscenced Counselor      [employer]     Social History Main Topics    Smoking status: Former Smoker     Types: Cigars    Smokeless tobacco: Never Used      Comment: cigars occas in past    Alcohol use No      Comment: occas (last drink March 2016)    Drug use: No    Sexual activity: Not Asked     Other Topics Concern    None     Social History Narrative  None     Family History   Problem Relation Age of Onset    High Blood Pressure Mother     Stroke Mother     Other Mother         MICKIE    Heart Disease Neg Hx     High Cholesterol Neg Hx        Review of Systems   Constitutional: Negative for unexpected weight change. Eyes: Negative for photophobia and visual disturbance. Respiratory: Negative for wheezing. Cardiovascular: Negative for chest pain and leg swelling. Gastrointestinal: Negative for abdominal pain, nausea and vomiting. Endocrine: Negative for polyphagia and polyuria. Neurological: Negative for dizziness, light-headedness and headaches. Psychiatric/Behavioral: Negative for decreased concentration. The patient is not nervous/anxious. OBJECTIVE:  Pulse Readings from Last 4 Encounters:   08/24/18 78   05/03/18 72   02/23/18 72   02/13/18 78      Wt Readings from Last 4 Encounters:   08/24/18 (!) 314 lb (142.4 kg)   05/25/18 (!) 307 lb (139.3 kg)   02/23/18 298 lb (135.2 kg)   02/13/18 294 lb 6.4 oz (133.5 kg)     BP Readings from Last 4 Encounters:   08/24/18 128/88   05/03/18 130/78   02/23/18 138/88   02/13/18 134/86     Physical Exam   Constitutional: He is oriented to person, place, and time. He appears well-developed and well-nourished. No distress. obese   Eyes: Conjunctivae are normal. No scleral icterus. Neck: Neck supple. Cardiovascular: Normal rate, regular rhythm and normal heart sounds. Pulmonary/Chest: Effort normal and breath sounds normal. No respiratory distress. He has no wheezes. Abdominal: Soft. Bowel sounds are normal.   Lymphadenopathy:     He has no cervical adenopathy. Neurological: He is alert and oriented to person, place, and time. Psychiatric: He has a normal mood and affect. Thought content normal.   Nursing note and vitals reviewed.       CBC:   Lab Results   Component Value Date    WBC 6.4 03/31/2017    HGB 14.4 03/31/2017    HCT 42.7 03/31/2017     03/31/2017     CMP:   Lab

## 2018-08-25 LAB
ESTIMATED AVERAGE GLUCOSE: 108.3 MG/DL
HBA1C MFR BLD: 5.4 %

## 2018-09-19 ENCOUNTER — NURSE ONLY (OUTPATIENT)
Dept: INTERNAL MEDICINE CLINIC | Age: 53
End: 2018-09-19

## 2018-09-19 DIAGNOSIS — Z23 NEED FOR HEPATITIS A VACCINATION: Primary | ICD-10-CM

## 2018-09-19 DIAGNOSIS — Z23 NEED FOR INFLUENZA VACCINATION: Primary | ICD-10-CM

## 2018-09-19 PROCEDURE — 90682 RIV4 VACC RECOMBINANT DNA IM: CPT | Performed by: INTERNAL MEDICINE

## 2018-09-19 PROCEDURE — 90471 IMMUNIZATION ADMIN: CPT | Performed by: INTERNAL MEDICINE

## 2018-09-19 PROCEDURE — 90632 HEPA VACCINE ADULT IM: CPT | Performed by: INTERNAL MEDICINE

## 2018-09-19 PROCEDURE — 90472 IMMUNIZATION ADMIN EACH ADD: CPT | Performed by: INTERNAL MEDICINE

## 2018-10-01 ENCOUNTER — OFFICE VISIT (OUTPATIENT)
Dept: INTERNAL MEDICINE CLINIC | Age: 53
End: 2018-10-01
Payer: COMMERCIAL

## 2018-10-01 ENCOUNTER — TELEPHONE (OUTPATIENT)
Dept: INTERNAL MEDICINE CLINIC | Age: 53
End: 2018-10-01

## 2018-10-01 VITALS
DIASTOLIC BLOOD PRESSURE: 84 MMHG | SYSTOLIC BLOOD PRESSURE: 122 MMHG | HEART RATE: 60 BPM | WEIGHT: 310.2 LBS | BODY MASS INDEX: 39.83 KG/M2

## 2018-10-01 DIAGNOSIS — J30.9 ACUTE ALLERGIC RHINITIS: Primary | ICD-10-CM

## 2018-10-01 DIAGNOSIS — J34.89 SINUS PAIN: ICD-10-CM

## 2018-10-01 PROCEDURE — 99213 OFFICE O/P EST LOW 20 MIN: CPT | Performed by: INTERNAL MEDICINE

## 2018-10-01 RX ORDER — CEFUROXIME AXETIL 250 MG/1
250 TABLET ORAL 2 TIMES DAILY
Qty: 20 TABLET | Refills: 0 | Status: SHIPPED | OUTPATIENT
Start: 2018-10-01 | End: 2018-10-11

## 2018-10-01 RX ORDER — PREDNISONE 20 MG/1
50 TABLET ORAL DAILY
Qty: 15 TABLET | Refills: 0 | Status: SHIPPED | OUTPATIENT
Start: 2018-10-01 | End: 2018-10-07

## 2018-10-01 ASSESSMENT — ENCOUNTER SYMPTOMS
HOARSE VOICE: 0
SINUS PRESSURE: 1
EYE DISCHARGE: 0
WHEEZING: 0
SINUS PAIN: 1
RHINORRHEA: 1
SHORTNESS OF BREATH: 0
CHEST TIGHTNESS: 0
TROUBLE SWALLOWING: 0
COUGH: 1
PHOTOPHOBIA: 0
SINUS COMPLAINT: 1

## 2018-10-01 NOTE — PROGRESS NOTES
Subjective:      Chief Complaint   Patient presents with    Nasal Congestion    Drainage    Head Congestion       Patient ID: Hans Gutierrez is a 48 y.o. male. Sinus Problem   This is a new problem. The current episode started yesterday. There has been no fever. Associated symptoms include congestion, coughing, headaches and sinus pressure. Pertinent negatives include no hoarse voice or shortness of breath. Treatments tried: flonase and clartin and zyrtec. The treatment provided no relief. Chief Complaint   Patient presents with    Nasal Congestion    Drainage    Head Congestion       Review of Systems   HENT: Positive for congestion, postnasal drip, rhinorrhea, sinus pain and sinus pressure. Negative for hoarse voice and trouble swallowing. Eyes: Negative for photophobia, discharge and visual disturbance. Respiratory: Positive for cough. Negative for chest tightness, shortness of breath and wheezing. Cardiovascular: Negative for chest pain and palpitations. Neurological: Positive for headaches. Negative for dizziness. Vitals:    10/01/18 1359   BP: 122/84   Pulse: 60   Weight: (!) 310 lb 3.2 oz (140.7 kg)         Objective:   Physical Exam   Constitutional: He is oriented to person, place, and time. He appears well-developed and well-nourished. HENT:   Nose: Mucosal edema and rhinorrhea present. Right sinus exhibits frontal sinus tenderness. Left sinus exhibits frontal sinus tenderness. Mouth/Throat: No oropharyngeal exudate. Eyes: Conjunctivae are normal. No scleral icterus. Neck: No thyromegaly present. Cardiovascular: Normal rate, regular rhythm and normal heart sounds. Pulmonary/Chest: Effort normal and breath sounds normal. No respiratory distress. He has no wheezes. Musculoskeletal: He exhibits no edema. Lymphadenopathy:     He has no cervical adenopathy. Neurological: He is alert and oriented to person, place, and time. Vitals reviewed.   Assessment/Plan:  Alycia Sotomayor

## 2018-10-11 ENCOUNTER — TELEPHONE (OUTPATIENT)
Dept: CARDIOLOGY CLINIC | Age: 53
End: 2018-10-11

## 2018-10-11 ENCOUNTER — TELEPHONE (OUTPATIENT)
Dept: INTERNAL MEDICINE CLINIC | Age: 53
End: 2018-10-11

## 2018-10-11 DIAGNOSIS — I10 ESSENTIAL HYPERTENSION: Primary | ICD-10-CM

## 2018-10-11 RX ORDER — LOSARTAN POTASSIUM AND HYDROCHLOROTHIAZIDE 12.5; 5 MG/1; MG/1
1 TABLET ORAL DAILY
Qty: 90 TABLET | Refills: 3 | Status: SHIPPED | OUTPATIENT
Start: 2018-10-11 | End: 2019-07-09

## 2018-10-11 NOTE — TELEPHONE ENCOUNTER
Pharmacist called stating Cardizem Er 90mg tabs are on back order, also needs to know what dose pt should be taking?  She states they have Cardizem 180mg tabs

## 2018-10-23 ENCOUNTER — OFFICE VISIT (OUTPATIENT)
Dept: PULMONOLOGY | Age: 53
End: 2018-10-23
Payer: COMMERCIAL

## 2018-10-23 VITALS
OXYGEN SATURATION: 94 % | HEIGHT: 74 IN | BODY MASS INDEX: 39.91 KG/M2 | HEART RATE: 79 BPM | DIASTOLIC BLOOD PRESSURE: 82 MMHG | SYSTOLIC BLOOD PRESSURE: 148 MMHG | WEIGHT: 311 LBS

## 2018-10-23 DIAGNOSIS — E66.9 CLASS 2 OBESITY WITH BODY MASS INDEX (BMI) OF 39.0 TO 39.9 IN ADULT, UNSPECIFIED OBESITY TYPE, UNSPECIFIED WHETHER SERIOUS COMORBIDITY PRESENT: Chronic | ICD-10-CM

## 2018-10-23 DIAGNOSIS — I48.0 PAROXYSMAL ATRIAL FIBRILLATION (HCC): Chronic | ICD-10-CM

## 2018-10-23 DIAGNOSIS — I10 ESSENTIAL HYPERTENSION: Chronic | ICD-10-CM

## 2018-10-23 DIAGNOSIS — G47.33 OBSTRUCTIVE SLEEP APNEA SYNDROME: Primary | Chronic | ICD-10-CM

## 2018-10-23 PROCEDURE — 99214 OFFICE O/P EST MOD 30 MIN: CPT | Performed by: NURSE PRACTITIONER

## 2018-10-23 ASSESSMENT — SLEEP AND FATIGUE QUESTIONNAIRES
HOW LIKELY ARE YOU TO NOD OFF OR FALL ASLEEP WHILE SITTING QUIETLY AFTER LUNCH WITHOUT ALCOHOL: 1
HOW LIKELY ARE YOU TO NOD OFF OR FALL ASLEEP WHILE SITTING INACTIVE IN A PUBLIC PLACE: 1
HOW LIKELY ARE YOU TO NOD OFF OR FALL ASLEEP IN A CAR, WHILE STOPPED FOR A FEW MINUTES IN TRAFFIC: 0
ESS TOTAL SCORE: 9
HOW LIKELY ARE YOU TO NOD OFF OR FALL ASLEEP WHILE WATCHING TV: 2
HOW LIKELY ARE YOU TO NOD OFF OR FALL ASLEEP WHILE SITTING AND TALKING TO SOMEONE: 0
HOW LIKELY ARE YOU TO NOD OFF OR FALL ASLEEP WHEN YOU ARE A PASSENGER IN A CAR FOR AN HOUR WITHOUT A BREAK: 1
HOW LIKELY ARE YOU TO NOD OFF OR FALL ASLEEP WHILE LYING DOWN TO REST IN THE AFTERNOON WHEN CIRCUMSTANCES PERMIT: 3
HOW LIKELY ARE YOU TO NOD OFF OR FALL ASLEEP WHILE SITTING AND READING: 1

## 2018-10-23 ASSESSMENT — ENCOUNTER SYMPTOMS
RHINORRHEA: 0
APNEA: 0
ABDOMINAL DISTENTION: 0
SHORTNESS OF BREATH: 0
SINUS PRESSURE: 0
COUGH: 0
ABDOMINAL PAIN: 0
EYE PAIN: 0
EYE REDNESS: 0

## 2019-01-02 RX ORDER — DILTIAZEM HYDROCHLORIDE 180 MG/1
CAPSULE, COATED, EXTENDED RELEASE ORAL
Qty: 90 CAPSULE | Refills: 3 | Status: SHIPPED | OUTPATIENT
Start: 2019-01-02 | End: 2019-12-30 | Stop reason: SDUPTHER

## 2019-04-12 ENCOUNTER — OFFICE VISIT (OUTPATIENT)
Dept: INTERNAL MEDICINE CLINIC | Age: 54
End: 2019-04-12
Payer: COMMERCIAL

## 2019-04-12 VITALS
SYSTOLIC BLOOD PRESSURE: 136 MMHG | WEIGHT: 315 LBS | BODY MASS INDEX: 40.43 KG/M2 | HEIGHT: 74 IN | HEART RATE: 72 BPM | DIASTOLIC BLOOD PRESSURE: 88 MMHG

## 2019-04-12 DIAGNOSIS — F32.A DEPRESSION, UNSPECIFIED DEPRESSION TYPE: ICD-10-CM

## 2019-04-12 DIAGNOSIS — F41.1 GENERALIZED ANXIETY DISORDER: Primary | ICD-10-CM

## 2019-04-12 DIAGNOSIS — Z23 NEED FOR HEPATITIS A VACCINATION: ICD-10-CM

## 2019-04-12 DIAGNOSIS — J34.89 SINUS PAIN: ICD-10-CM

## 2019-04-12 DIAGNOSIS — I10 ESSENTIAL HYPERTENSION: ICD-10-CM

## 2019-04-12 PROCEDURE — 90471 IMMUNIZATION ADMIN: CPT | Performed by: INTERNAL MEDICINE

## 2019-04-12 PROCEDURE — 99213 OFFICE O/P EST LOW 20 MIN: CPT | Performed by: INTERNAL MEDICINE

## 2019-04-12 PROCEDURE — 90632 HEPA VACCINE ADULT IM: CPT | Performed by: INTERNAL MEDICINE

## 2019-04-12 PROCEDURE — G0444 DEPRESSION SCREEN ANNUAL: HCPCS | Performed by: INTERNAL MEDICINE

## 2019-04-12 RX ORDER — CITALOPRAM 20 MG/1
20 TABLET ORAL DAILY
Qty: 90 TABLET | Refills: 1 | Status: SHIPPED | OUTPATIENT
Start: 2019-04-12 | End: 2019-10-28 | Stop reason: SDUPTHER

## 2019-04-12 RX ORDER — AMOXICILLIN AND CLAVULANATE POTASSIUM 875; 125 MG/1; MG/1
1 TABLET, FILM COATED ORAL 2 TIMES DAILY
Qty: 20 TABLET | Refills: 0 | Status: SHIPPED | OUTPATIENT
Start: 2019-04-12 | End: 2020-01-09 | Stop reason: SDUPTHER

## 2019-04-12 ASSESSMENT — PATIENT HEALTH QUESTIONNAIRE - PHQ9
7. TROUBLE CONCENTRATING ON THINGS, SUCH AS READING THE NEWSPAPER OR WATCHING TELEVISION: 1
SUM OF ALL RESPONSES TO PHQ QUESTIONS 1-9: 16
4. FEELING TIRED OR HAVING LITTLE ENERGY: 3
10. IF YOU CHECKED OFF ANY PROBLEMS, HOW DIFFICULT HAVE THESE PROBLEMS MADE IT FOR YOU TO DO YOUR WORK, TAKE CARE OF THINGS AT HOME, OR GET ALONG WITH OTHER PEOPLE: 1
8. MOVING OR SPEAKING SO SLOWLY THAT OTHER PEOPLE COULD HAVE NOTICED. OR THE OPPOSITE, BEING SO FIGETY OR RESTLESS THAT YOU HAVE BEEN MOVING AROUND A LOT MORE THAN USUAL: 1
SUM OF ALL RESPONSES TO PHQ QUESTIONS 1-9: 16
5. POOR APPETITE OR OVEREATING: 3
9. THOUGHTS THAT YOU WOULD BE BETTER OFF DEAD, OR OF HURTING YOURSELF: 0
3. TROUBLE FALLING OR STAYING ASLEEP: 2
SUM OF ALL RESPONSES TO PHQ9 QUESTIONS 1 & 2: 4
6. FEELING BAD ABOUT YOURSELF - OR THAT YOU ARE A FAILURE OR HAVE LET YOURSELF OR YOUR FAMILY DOWN: 2
2. FEELING DOWN, DEPRESSED OR HOPELESS: 2
1. LITTLE INTEREST OR PLEASURE IN DOING THINGS: 2

## 2019-04-12 ASSESSMENT — ENCOUNTER SYMPTOMS
WHEEZING: 0
TROUBLE SWALLOWING: 0
RHINORRHEA: 1
ABDOMINAL PAIN: 0
VOICE CHANGE: 0
NAUSEA: 0
SHORTNESS OF BREATH: 0
SINUS PAIN: 1

## 2019-04-12 NOTE — PROGRESS NOTES
Non-medical: None   Tobacco Use    Smoking status: Former Smoker     Types: Cigars    Smokeless tobacco: Never Used    Tobacco comment: cigars occas in past   Substance and Sexual Activity    Alcohol use: No     Comment: occas (last drink March 2016)    Drug use: No    Sexual activity: None   Lifestyle    Physical activity:     Days per week: None     Minutes per session: None    Stress: None   Relationships    Social connections:     Talks on phone: None     Gets together: None     Attends Gnosticism service: None     Active member of club or organization: None     Attends meetings of clubs or organizations: None     Relationship status: None    Intimate partner violence:     Fear of current or ex partner: None     Emotionally abused: None     Physically abused: None     Forced sexual activity: None   Other Topics Concern    None   Social History Narrative    None     Family History   Problem Relation Age of Onset    High Blood Pressure Mother     Stroke Mother     Other Mother         MICKIE    Heart Disease Neg Hx     High Cholesterol Neg Hx        Review of Systems   Constitutional: Negative for activity change and unexpected weight change. HENT: Positive for congestion, rhinorrhea and sinus pain. Negative for trouble swallowing and voice change. Respiratory: Negative for shortness of breath and wheezing. Cardiovascular: Negative for chest pain and palpitations. Gastrointestinal: Negative for abdominal pain and nausea. Neurological: Negative for dizziness and light-headedness.        OBJECTIVE:  Pulse Readings from Last 4 Encounters:   04/12/19 72   10/23/18 79   10/01/18 60   08/24/18 78     Wt Readings from Last 4 Encounters:   04/12/19 (!) 317 lb (143.8 kg)   10/23/18 (!) 311 lb (141.1 kg)   10/01/18 (!) 310 lb 3.2 oz (140.7 kg)   08/24/18 (!) 314 lb (142.4 kg)     BP Readings from Last 4 Encounters:   04/12/19 136/88   10/23/18 (!) 148/82   10/01/18 122/84   08/24/18 128/88 Physical Exam   Constitutional: He is oriented to person, place, and time. No distress. obese   HENT:   Nasal mucosal swelling   Cardiovascular: Normal rate, regular rhythm and normal heart sounds. Pulmonary/Chest: Effort normal and breath sounds normal.   Abdominal: Soft. Bowel sounds are normal.   Neurological: He is alert and oriented to person, place, and time. Nursing note and vitals reviewed.       CBC:   Lab Results   Component Value Date    WBC 6.4 03/31/2017    HGB 14.4 03/31/2017    HCT 42.7 03/31/2017     03/31/2017     CMP:  Lab Results   Component Value Date     08/24/2018    K 4.3 08/24/2018     08/24/2018    CO2 27 08/24/2018    ANIONGAP 12 08/24/2018    GLUCOSE 105 08/24/2018    BUN 15 08/24/2018    CREATININE 1.1 08/24/2018    GFRAA >60 08/24/2018    CALCIUM 9.7 08/24/2018    PROT 6.7 03/31/2017    LABALBU 4.5 03/31/2017    AGRATIO 2.0 03/31/2017    BILITOT 0.5 03/31/2017    ALKPHOS 91 03/31/2017    ALT 37 03/31/2017    AST 16 08/23/2017    GLOB 2.2 03/31/2017     URINALYSIS:  Lab Results   Component Value Date    GLUCOSEU Negative 03/31/2017    KETUA Negative 03/31/2017    SPECGRAV 1.023 03/31/2017    BLOODU Negative 03/31/2017    PHUR 6.0 03/31/2017    PROTEINU Negative 03/31/2017    NITRU Negative 03/31/2017    LEUKOCYTESUR Negative 03/31/2017    LABMICR Not Indicated 03/31/2017    URINETYPE Clean catch 03/31/2017     HBA1C:   Lab Results   Component Value Date    LABA1C 5.4 08/24/2018    .3 08/24/2018     MICRO/ALB:   Lab Results   Component Value Date    LABMICR Not Indicated 03/31/2017     LIPID:  Lab Results   Component Value Date    CHOL 216 05/25/2018    TRIG 136 05/25/2018    HDL 44 05/25/2018    LDLCALC 145 05/25/2018    LABVLDL 38 08/23/2017     TSH:   Lab Results   Component Value Date    TSHREFLEX 1.99 03/31/2017     PSA:   Lab Results   Component Value Date    PSA 0.83 08/24/2018        ASSESSMENT/PLAN:      Sivakumar Bai was seen today for depression, other, eye problem and hypertension. Diagnoses and all orders for this visit:    Generalized anxiety disorder  -     citalopram (CELEXA) 20 MG tablet; Take 1 tablet by mouth daily    Need for hepatitis A vaccination  -     Hep A Vaccine Adult (VAQTA)    Essential hypertension  Well controlled. Diet lifestyle modification  Sinus pain/recurrent rhinosinusitis  -     amoxicillin-clavulanate (AUGMENTIN) 875-125 MG per tablet; Take 1 tablet by mouth 2 times daily for 10 days  May use over-the-counter antihistamine like Claritin  Depression, unspecified depression type  Increase citalopram to 20 mg per day          Orders Placed This Encounter   Procedures    Hep A Vaccine Adult (VAQTA)     Current Outpatient Medications   Medication Sig Dispense Refill    citalopram (CELEXA) 20 MG tablet Take 1 tablet by mouth daily 90 tablet 1    amoxicillin-clavulanate (AUGMENTIN) 875-125 MG per tablet Take 1 tablet by mouth 2 times daily for 10 days 20 tablet 0    diltiazem (CARDIZEM CD) 180 MG extended release capsule TAKE 1 CAPSULE BY MOUTH ONE TIME A DAY 90 capsule 3    losartan-hydrochlorothiazide (HYZAAR) 50-12.5 MG per tablet Take 1 tablet by mouth daily 90 tablet 3    dicyclomine (BENTYL) 10 MG capsule Take 1 capsule by mouth 4 times daily as needed (cramping) 120 capsule 1    BiPAP Machine MISC by Does not apply route At least 5 hours      aspirin  MG EC tablet Take 1 tablet by mouth daily      Misc Natural Products (NF FORMULAS TESTOSTERONE PO) Take by mouth Off and on 30 days at a time. No current facility-administered medications for this visit. Return in about 3 months (around 7/12/2019) for physical.  An After Visit Summary was printed and given to the patient. Documentation was done using voice recognition dragon software. Every effort was made to ensure accuracy; however, inadvertent  Unintentional computerized transcription errors may be present.

## 2019-07-09 ENCOUNTER — TELEPHONE (OUTPATIENT)
Dept: INTERNAL MEDICINE CLINIC | Age: 54
End: 2019-07-09

## 2019-07-09 ENCOUNTER — OFFICE VISIT (OUTPATIENT)
Dept: INTERNAL MEDICINE CLINIC | Age: 54
End: 2019-07-09
Payer: COMMERCIAL

## 2019-07-09 VITALS
SYSTOLIC BLOOD PRESSURE: 145 MMHG | DIASTOLIC BLOOD PRESSURE: 90 MMHG | TEMPERATURE: 98.1 F | WEIGHT: 315 LBS | BODY MASS INDEX: 40.43 KG/M2 | HEART RATE: 79 BPM | OXYGEN SATURATION: 98 % | HEIGHT: 74 IN

## 2019-07-09 DIAGNOSIS — R63.5 WEIGHT GAIN: ICD-10-CM

## 2019-07-09 DIAGNOSIS — I10 ESSENTIAL HYPERTENSION: Primary | ICD-10-CM

## 2019-07-09 DIAGNOSIS — G44.219 EPISODIC TENSION-TYPE HEADACHE, NOT INTRACTABLE: ICD-10-CM

## 2019-07-09 LAB
A/G RATIO: 1.7 (ref 1.1–2.2)
ALBUMIN SERPL-MCNC: 4.8 G/DL (ref 3.4–5)
ALP BLD-CCNC: 96 U/L (ref 40–129)
ALT SERPL-CCNC: 40 U/L (ref 10–40)
ANION GAP SERPL CALCULATED.3IONS-SCNC: 13 MMOL/L (ref 3–16)
AST SERPL-CCNC: 22 U/L (ref 15–37)
BASOPHILS ABSOLUTE: 0 K/UL (ref 0–0.2)
BASOPHILS RELATIVE PERCENT: 0.4 %
BILIRUB SERPL-MCNC: 0.3 MG/DL (ref 0–1)
BUN BLDV-MCNC: 15 MG/DL (ref 7–20)
CALCIUM SERPL-MCNC: 9.9 MG/DL (ref 8.3–10.6)
CHLORIDE BLD-SCNC: 102 MMOL/L (ref 99–110)
CO2: 26 MMOL/L (ref 21–32)
CREAT SERPL-MCNC: 1.1 MG/DL (ref 0.9–1.3)
EOSINOPHILS ABSOLUTE: 0.1 K/UL (ref 0–0.6)
EOSINOPHILS RELATIVE PERCENT: 1.6 %
GFR AFRICAN AMERICAN: >60
GFR NON-AFRICAN AMERICAN: >60
GLOBULIN: 2.9 G/DL
GLUCOSE BLD-MCNC: 85 MG/DL (ref 70–99)
HCT VFR BLD CALC: 41.4 % (ref 40.5–52.5)
HEMOGLOBIN: 14.2 G/DL (ref 13.5–17.5)
LYMPHOCYTES ABSOLUTE: 1.8 K/UL (ref 1–5.1)
LYMPHOCYTES RELATIVE PERCENT: 24.4 %
MCH RBC QN AUTO: 30.3 PG (ref 26–34)
MCHC RBC AUTO-ENTMCNC: 34.4 G/DL (ref 31–36)
MCV RBC AUTO: 88.1 FL (ref 80–100)
MONOCYTES ABSOLUTE: 0.7 K/UL (ref 0–1.3)
MONOCYTES RELATIVE PERCENT: 9.6 %
NEUTROPHILS ABSOLUTE: 4.7 K/UL (ref 1.7–7.7)
NEUTROPHILS RELATIVE PERCENT: 64 %
PDW BLD-RTO: 13.8 % (ref 12.4–15.4)
PLATELET # BLD: 275 K/UL (ref 135–450)
PMV BLD AUTO: 8.7 FL (ref 5–10.5)
POTASSIUM SERPL-SCNC: 4.5 MMOL/L (ref 3.5–5.1)
RBC # BLD: 4.7 M/UL (ref 4.2–5.9)
SODIUM BLD-SCNC: 141 MMOL/L (ref 136–145)
TOTAL PROTEIN: 7.7 G/DL (ref 6.4–8.2)
TSH REFLEX FT4: 3.27 UIU/ML (ref 0.27–4.2)
WBC # BLD: 7.4 K/UL (ref 4–11)

## 2019-07-09 PROCEDURE — 99213 OFFICE O/P EST LOW 20 MIN: CPT | Performed by: NURSE PRACTITIONER

## 2019-07-09 RX ORDER — LOSARTAN POTASSIUM AND HYDROCHLOROTHIAZIDE 12.5; 1 MG/1; MG/1
1 TABLET ORAL DAILY
Qty: 30 TABLET | Refills: 1 | Status: SHIPPED | OUTPATIENT
Start: 2019-07-09 | End: 2019-07-23 | Stop reason: SDUPTHER

## 2019-07-09 RX ORDER — BLOOD PRESSURE TEST KIT
1 KIT MISCELLANEOUS DAILY
Qty: 1 KIT | Refills: 0 | Status: SHIPPED | OUTPATIENT
Start: 2019-07-09 | End: 2020-10-15

## 2019-07-09 ASSESSMENT — ENCOUNTER SYMPTOMS
WHEEZING: 0
CONSTIPATION: 0
SHORTNESS OF BREATH: 0
NAUSEA: 0
ABDOMINAL PAIN: 0
VOMITING: 0
COUGH: 0
DIARRHEA: 0

## 2019-07-09 NOTE — PROGRESS NOTES
affect. His behavior is normal. Judgment and thought content normal.   Vitals reviewed. ASSESSMENT/PLAN:  Aurelio Peña was seen today for headache and weight gain. Diagnoses and all orders for this visit:    Essential hypertension/Episodic tension-type headache, not intractable  -    Concerned that his headaches may be due to elevated blood pressure. Patient reports that he took his blood pressure at home and it was [id number] systolically as well. - Reviewed imaging versus increasing blood pressure medication versus as needed or daily headache medication. The patient is agreeable to increasing his blood pressure medication at this time and continue using Tylenol as needed and return in 2 weeks for follow-up. - Will prescribe a blood pressure cuff and patient will take blood pressure and call if BP remains greater than goal of 130/80 at home. - losartan-hydrochlorothiazide (HYZAAR) 100-12.5 MG per tablet; Take 1 tablet by mouth daily-side effects reviewed. - increased losartan. -     Blood Pressure KIT; 1 kit by Does not apply route daily  - Patient informed to call if he develops red flag symptoms such as photophobia, sudden onset headache, the worst headache of his life, or vision changes. The patient is agreeable to this plan. Weight gain  -     The patient reports that he has gained significant weight in the last few months. He reports that he does not exercise, but he is an active mirtha at work. - Lifestyle modifications such as exercise, weight loss and healthy diet encouraged and reviewed with the pt. - Will evaluate labs today.  - COMPREHENSIVE METABOLIC PANEL; Future  -     CBC Auto Differential; Future  -     TSH WITH REFLEX TO FT4; Future    Return in about 2 weeks (around 7/23/2019) for headaches/BP f/u, or sooner if needed. Pt informed to call if symptoms worsen or fail to improve. All questions answered. Patient states no further questions or concerns at this time.     Electronically signed by DAVID Landaverde - CNP 07/09/19

## 2019-07-15 ASSESSMENT — ENCOUNTER SYMPTOMS
NAUSEA: 0
SHORTNESS OF BREATH: 0
COUGH: 0
VOMITING: 0
DIARRHEA: 0
ABDOMINAL PAIN: 0
WHEEZING: 0
CONSTIPATION: 0

## 2019-07-23 ENCOUNTER — OFFICE VISIT (OUTPATIENT)
Dept: INTERNAL MEDICINE CLINIC | Age: 54
End: 2019-07-23
Payer: COMMERCIAL

## 2019-07-23 VITALS
BODY MASS INDEX: 40.43 KG/M2 | WEIGHT: 315 LBS | SYSTOLIC BLOOD PRESSURE: 122 MMHG | HEIGHT: 74 IN | HEART RATE: 79 BPM | DIASTOLIC BLOOD PRESSURE: 80 MMHG | OXYGEN SATURATION: 98 %

## 2019-07-23 DIAGNOSIS — I10 ESSENTIAL HYPERTENSION: Primary | ICD-10-CM

## 2019-07-23 DIAGNOSIS — G44.219 EPISODIC TENSION-TYPE HEADACHE, NOT INTRACTABLE: ICD-10-CM

## 2019-07-23 PROCEDURE — 99213 OFFICE O/P EST LOW 20 MIN: CPT | Performed by: NURSE PRACTITIONER

## 2019-07-23 RX ORDER — LOSARTAN POTASSIUM AND HYDROCHLOROTHIAZIDE 12.5; 1 MG/1; MG/1
1 TABLET ORAL DAILY
Qty: 90 TABLET | Refills: 0 | Status: SHIPPED | OUTPATIENT
Start: 2019-07-23 | End: 2019-10-30 | Stop reason: SDUPTHER

## 2019-08-23 ENCOUNTER — OFFICE VISIT (OUTPATIENT)
Dept: INTERNAL MEDICINE CLINIC | Age: 54
End: 2019-08-23
Payer: COMMERCIAL

## 2019-08-23 VITALS
WEIGHT: 315 LBS | SYSTOLIC BLOOD PRESSURE: 136 MMHG | HEART RATE: 77 BPM | HEIGHT: 74 IN | BODY MASS INDEX: 40.43 KG/M2 | DIASTOLIC BLOOD PRESSURE: 82 MMHG | OXYGEN SATURATION: 98 %

## 2019-08-23 DIAGNOSIS — F32.A DEPRESSION, UNSPECIFIED DEPRESSION TYPE: ICD-10-CM

## 2019-08-23 DIAGNOSIS — Z12.5 SCREENING FOR PROSTATE CANCER: ICD-10-CM

## 2019-08-23 DIAGNOSIS — E66.01 CLASS 3 SEVERE OBESITY DUE TO EXCESS CALORIES WITH BODY MASS INDEX (BMI) OF 40.0 TO 44.9 IN ADULT, UNSPECIFIED WHETHER SERIOUS COMORBIDITY PRESENT (HCC): Primary | ICD-10-CM

## 2019-08-23 DIAGNOSIS — E78.2 MIXED HYPERLIPIDEMIA: ICD-10-CM

## 2019-08-23 DIAGNOSIS — I10 ESSENTIAL HYPERTENSION: ICD-10-CM

## 2019-08-23 PROCEDURE — 99214 OFFICE O/P EST MOD 30 MIN: CPT | Performed by: INTERNAL MEDICINE

## 2019-08-23 ASSESSMENT — ENCOUNTER SYMPTOMS
SHORTNESS OF BREATH: 0
PHOTOPHOBIA: 0
NAUSEA: 0
WHEEZING: 0
ABDOMINAL PAIN: 0

## 2019-08-23 NOTE — PATIENT INSTRUCTIONS
not use this medicine if you are allergic to bupropion or naltrexone, or if you have:  · untreated or uncontrolled high blood pressure;  · an eating disorder (anorexia or bulimia);  · a history of seizures;  · opioid addiction or withdrawal (or if you take methadone or buprenorphine);  · if you take other forms of bupropion (Wellbutrin, Aplenzin, Budeprion, Forfivo, Zyban, and others); or  · if you have suddenly stopped using alcohol, seizure medication, or a sedative such as Xanax, Valium, Fiorinal, Klonopin, and others). Do not use an MAO inhibitor within 14 days before or 14 days after you take bupropion and naltrexone. A dangerous drug interaction could occur. MAO inhibitors include isocarboxazid, linezolid, phenelzine, rasagiline, selegiline, and tranylcypromine. Tell your doctor if you have ever had:  · depression, bipolar disorder, or mental illness;  · suicidal thoughts or actions;  · a head injury;  · a tumor or infection in your brain or spinal cord;  · diabetes or low blood sugar;  · low sodium levels;  · liver or kidney disease;  · heart disease, high blood pressure, heart attack, or stroke; or  · drug addiction, or if you normally drink a lot of alcohol. Some young people have thoughts about suicide when first taking bupropion. Your doctor should check your progress at regular visits. Your family or other caregivers should also be alert to changes in your mood or symptoms. You should not breast-feed while using this medicine. This medicine is not approved for use by anyone younger than 25years old. How should I take bupropion and naltrexone? Follow all directions on your prescription label and read all medication guides or instruction sheets. Use the medicine exactly as directed. Swallow the tablet whole and do not crush, chew, or break it. Do not take more than 2 tablets at once. Do not take this medicine with a high-fat meal, or you may be more likely to have a seizure.   If you need to use hard to wake up. Stop taking this medicine and call your doctor right away if you have:  · a seizure (convulsions);  · blurred vision, tunnel vision, eye pain or swelling, or seeing halos around lights;  · changes in mood or behavior --anxiety, depression, panic attacks, trouble sleeping, agitation, thoughts about suicide or hurting yourself;  · a manic episode --racing thoughts, increased energy, unusual risk-taking behavior, extreme happiness, being irritable or talkative;  · liver problems --upper stomach pain, tiredness, dark urine, jaundice (yellowing of the skin or eyes);  · increased blood pressure --severe headache, blurred vision, pounding in your neck or ears, anxiety, nosebleed; or  · severe skin reaction --fever, mouth or throat pain, burning eyes, skin pain, red or purple rash that spreads and causes blisters and peeling. Older adults may be more likely to have certain side effects. Common side effects may include:  · nausea, vomiting, diarrhea, constipation;  · headache, dizziness;  · dry mouth; or  · sleep problems (insomnia). This is not a complete list of side effects and others may occur. Call your doctor for medical advice about side effects. You may report side effects to FDA at 3-263-FDA-4664. What other drugs will affect bupropion and naltrexone? When you start or stop taking bupropion and naltrexone, your doctor may need to adjust the doses of other medicines you take on a regular basis. Tell your doctor about all your current medicines and any you start or stop using. This includes prescription and over-the-counter medicines, vitamins, and herbal products. Not all possible interactions are listed in this medication guide. Where can I get more information? Your pharmacist can provide more information about bupropion and naltrexone.   Remember, keep this and all other medicines out of the reach of children, never share your medicines with others, and use this medication only for the

## 2019-08-23 NOTE — PROGRESS NOTES
Readings from Last 4 Encounters:   08/23/19 (!) 327 lb (148.3 kg)   07/23/19 (!) 328 lb (148.8 kg)   07/09/19 (!) 330 lb (149.7 kg)   04/12/19 (!) 317 lb (143.8 kg)     BP Readings from Last 4 Encounters:   08/23/19 136/82   07/23/19 122/80   07/09/19 (!) 145/90   04/12/19 136/88     Physical Exam   Constitutional: He is oriented to person, place, and time. No distress. obese   Eyes: Conjunctivae are normal. No scleral icterus. Neck: No thyromegaly present. Cardiovascular: Normal rate, regular rhythm and normal heart sounds. Pulmonary/Chest: Effort normal and breath sounds normal.   Abdominal: Soft. Bowel sounds are normal.   Musculoskeletal:   No pitting edema. Bilateral puffy shin and foot-likely secondary to diltiazem. Lymphadenopathy:     He has no cervical adenopathy. Neurological: He is alert and oriented to person, place, and time. Skin: He is not diaphoretic. Nursing note and vitals reviewed.       CBC:   Lab Results   Component Value Date    WBC 7.4 07/09/2019    HGB 14.2 07/09/2019    HCT 41.4 07/09/2019     07/09/2019     CMP:  Lab Results   Component Value Date     07/09/2019    K 4.5 07/09/2019     07/09/2019    CO2 26 07/09/2019    ANIONGAP 13 07/09/2019    GLUCOSE 85 07/09/2019    BUN 15 07/09/2019    CREATININE 1.1 07/09/2019    GFRAA >60 07/09/2019    CALCIUM 9.9 07/09/2019    PROT 7.7 07/09/2019    LABALBU 4.8 07/09/2019    AGRATIO 1.7 07/09/2019    BILITOT 0.3 07/09/2019    ALKPHOS 96 07/09/2019    ALT 40 07/09/2019    AST 22 07/09/2019    GLOB 2.9 07/09/2019     URINALYSIS:  Lab Results   Component Value Date    GLUCOSEU Negative 03/31/2017    KETUA Negative 03/31/2017    SPECGRAV 1.023 03/31/2017    BLOODU Negative 03/31/2017    PHUR 6.0 03/31/2017    PROTEINU Negative 03/31/2017    NITRU Negative 03/31/2017    LEUKOCYTESUR Negative 03/31/2017    LABMICR Not Indicated 03/31/2017    URINETYPE Clean catch 03/31/2017     HBA1C:   Lab Results   Component Value Date

## 2019-10-30 DIAGNOSIS — I10 ESSENTIAL HYPERTENSION: ICD-10-CM

## 2019-10-30 RX ORDER — LOSARTAN POTASSIUM AND HYDROCHLOROTHIAZIDE 12.5; 1 MG/1; MG/1
1 TABLET ORAL DAILY
Qty: 30 TABLET | Refills: 0 | OUTPATIENT
Start: 2019-10-30 | End: 2019-12-02 | Stop reason: SDUPTHER

## 2019-11-22 ENCOUNTER — OFFICE VISIT (OUTPATIENT)
Dept: INTERNAL MEDICINE CLINIC | Age: 54
End: 2019-11-22
Payer: COMMERCIAL

## 2019-11-22 VITALS
HEART RATE: 72 BPM | RESPIRATION RATE: 18 BRPM | HEIGHT: 73 IN | WEIGHT: 315 LBS | BODY MASS INDEX: 41.75 KG/M2 | SYSTOLIC BLOOD PRESSURE: 132 MMHG | DIASTOLIC BLOOD PRESSURE: 88 MMHG

## 2019-11-22 DIAGNOSIS — R10.13 EPIGASTRIC PAIN: Primary | ICD-10-CM

## 2019-11-22 DIAGNOSIS — Z86.39 H/O HYPERLIPIDEMIA: ICD-10-CM

## 2019-11-22 DIAGNOSIS — R35.0 URINARY FREQUENCY: ICD-10-CM

## 2019-11-22 DIAGNOSIS — R53.83 OTHER FATIGUE: ICD-10-CM

## 2019-11-22 DIAGNOSIS — R10.13 EPIGASTRIC PAIN: ICD-10-CM

## 2019-11-22 DIAGNOSIS — K59.09 OTHER CONSTIPATION: ICD-10-CM

## 2019-11-22 DIAGNOSIS — Z12.5 SCREENING FOR PROSTATE CANCER: ICD-10-CM

## 2019-11-22 DIAGNOSIS — F41.1 GENERALIZED ANXIETY DISORDER: ICD-10-CM

## 2019-11-22 LAB
ALBUMIN SERPL-MCNC: 4.4 G/DL (ref 3.4–5)
ALP BLD-CCNC: 97 U/L (ref 40–129)
ALT SERPL-CCNC: 32 U/L (ref 10–40)
AMYLASE: 59 U/L (ref 25–115)
ANION GAP SERPL CALCULATED.3IONS-SCNC: 12 MMOL/L (ref 3–16)
AST SERPL-CCNC: 19 U/L (ref 15–37)
BILIRUB SERPL-MCNC: 0.3 MG/DL (ref 0–1)
BILIRUBIN DIRECT: <0.2 MG/DL (ref 0–0.3)
BILIRUBIN URINE: NEGATIVE
BILIRUBIN, INDIRECT: NORMAL MG/DL (ref 0–1)
BLOOD, URINE: NEGATIVE
BUN BLDV-MCNC: 14 MG/DL (ref 7–20)
CALCIUM SERPL-MCNC: 9.3 MG/DL (ref 8.3–10.6)
CHLORIDE BLD-SCNC: 104 MMOL/L (ref 99–110)
CHOLESTEROL, FASTING: 209 MG/DL (ref 0–199)
CLARITY: CLEAR
CO2: 25 MMOL/L (ref 21–32)
COLOR: YELLOW
CREAT SERPL-MCNC: 1.1 MG/DL (ref 0.9–1.3)
GFR AFRICAN AMERICAN: >60
GFR NON-AFRICAN AMERICAN: >60
GLUCOSE BLD-MCNC: 104 MG/DL (ref 70–99)
GLUCOSE URINE: NEGATIVE MG/DL
HCT VFR BLD CALC: 41.1 % (ref 40.5–52.5)
HDLC SERPL-MCNC: 37 MG/DL (ref 40–60)
HEMOGLOBIN: 14.3 G/DL (ref 13.5–17.5)
KETONES, URINE: NEGATIVE MG/DL
LDL CHOLESTEROL CALCULATED: 141 MG/DL
LEUKOCYTE ESTERASE, URINE: NEGATIVE
LIPASE: 45 U/L (ref 13–60)
MCH RBC QN AUTO: 29.9 PG (ref 26–34)
MCHC RBC AUTO-ENTMCNC: 34.6 G/DL (ref 31–36)
MCV RBC AUTO: 86.4 FL (ref 80–100)
MICROSCOPIC EXAMINATION: NORMAL
NITRITE, URINE: NEGATIVE
PDW BLD-RTO: 13.5 % (ref 12.4–15.4)
PH UA: 6 (ref 5–8)
PLATELET # BLD: 260 K/UL (ref 135–450)
PMV BLD AUTO: 9 FL (ref 5–10.5)
POTASSIUM SERPL-SCNC: 4.4 MMOL/L (ref 3.5–5.1)
PROSTATE SPECIFIC ANTIGEN: 0.86 NG/ML (ref 0–4)
PROTEIN UA: NEGATIVE MG/DL
RBC # BLD: 4.76 M/UL (ref 4.2–5.9)
SODIUM BLD-SCNC: 141 MMOL/L (ref 136–145)
SPECIFIC GRAVITY UA: 1.03 (ref 1–1.03)
TOTAL PROTEIN: 6.7 G/DL (ref 6.4–8.2)
TRIGLYCERIDE, FASTING: 157 MG/DL (ref 0–150)
TSH REFLEX: 3.56 UIU/ML (ref 0.27–4.2)
URINE TYPE: NORMAL
UROBILINOGEN, URINE: 0.2 E.U./DL
VLDLC SERPL CALC-MCNC: 31 MG/DL
WBC # BLD: 5.9 K/UL (ref 4–11)

## 2019-11-22 PROCEDURE — 99214 OFFICE O/P EST MOD 30 MIN: CPT | Performed by: INTERNAL MEDICINE

## 2019-11-22 RX ORDER — AMOXICILLIN 250 MG
1 CAPSULE ORAL DAILY
Qty: 30 TABLET | Refills: 2 | Status: SHIPPED | OUTPATIENT
Start: 2019-11-22 | End: 2020-05-19

## 2019-11-22 RX ORDER — CITALOPRAM 20 MG/1
TABLET ORAL
Qty: 90 TABLET | Refills: 0 | Status: SHIPPED | OUTPATIENT
Start: 2019-11-22 | End: 2020-05-13 | Stop reason: SDUPTHER

## 2019-11-22 RX ORDER — LANSOPRAZOLE 30 MG/1
30 CAPSULE, DELAYED RELEASE ORAL DAILY
Qty: 30 CAPSULE | Refills: 3 | Status: SHIPPED | OUTPATIENT
Start: 2019-11-22 | End: 2020-03-30

## 2019-11-22 ASSESSMENT — ENCOUNTER SYMPTOMS
WHEEZING: 0
NAUSEA: 1
CONSTIPATION: 1
VOICE CHANGE: 0
VOMITING: 0
ABDOMINAL PAIN: 1
BLOATING: 0
HEMATEMESIS: 0
HEMATOCHEZIA: 0
DIARRHEA: 0
SHORTNESS OF BREATH: 0
JAUNDICE: 0
BACK PAIN: 0
TROUBLE SWALLOWING: 0

## 2019-11-25 RX ORDER — ATORVASTATIN CALCIUM 20 MG/1
20 TABLET, FILM COATED ORAL DAILY
Qty: 30 TABLET | Refills: 3 | Status: SHIPPED | OUTPATIENT
Start: 2019-11-25 | End: 2020-02-24 | Stop reason: SDUPTHER

## 2019-11-27 ENCOUNTER — ANESTHESIA EVENT (OUTPATIENT)
Dept: ENDOSCOPY | Age: 54
End: 2019-11-27
Payer: COMMERCIAL

## 2019-12-05 ENCOUNTER — TELEPHONE (OUTPATIENT)
Dept: INTERNAL MEDICINE CLINIC | Age: 54
End: 2019-12-05

## 2019-12-05 PROBLEM — K76.0 FATTY LIVER: Status: ACTIVE | Noted: 2019-12-05

## 2019-12-05 PROBLEM — N40.0 BENIGN PROSTATIC HYPERPLASIA WITHOUT LOWER URINARY TRACT SYMPTOMS: Status: ACTIVE | Noted: 2019-12-05

## 2019-12-05 PROBLEM — K44.9 HIATAL HERNIA: Status: ACTIVE | Noted: 2019-12-05

## 2019-12-05 RX ORDER — TAMSULOSIN HYDROCHLORIDE 0.4 MG/1
0.4 CAPSULE ORAL DAILY
Qty: 30 CAPSULE | Refills: 5 | Status: SHIPPED | OUTPATIENT
Start: 2019-12-05 | End: 2020-05-19

## 2019-12-09 ENCOUNTER — HOSPITAL ENCOUNTER (OUTPATIENT)
Age: 54
Setting detail: OUTPATIENT SURGERY
Discharge: HOME | End: 2019-12-09
Attending: INTERNAL MEDICINE | Admitting: INTERNAL MEDICINE
Payer: COMMERCIAL

## 2019-12-09 ENCOUNTER — ANESTHESIA (OUTPATIENT)
Dept: ENDOSCOPY | Age: 54
End: 2019-12-09
Payer: COMMERCIAL

## 2019-12-09 VITALS
RESPIRATION RATE: 16 BRPM | BODY MASS INDEX: 42.66 KG/M2 | HEIGHT: 72 IN | HEART RATE: 67 BPM | OXYGEN SATURATION: 97 % | WEIGHT: 315 LBS | SYSTOLIC BLOOD PRESSURE: 140 MMHG | TEMPERATURE: 97.2 F | DIASTOLIC BLOOD PRESSURE: 92 MMHG

## 2019-12-09 VITALS — OXYGEN SATURATION: 94 % | SYSTOLIC BLOOD PRESSURE: 144 MMHG | DIASTOLIC BLOOD PRESSURE: 85 MMHG

## 2019-12-09 PROCEDURE — 7100000010 HC PHASE II RECOVERY - FIRST 15 MIN: Performed by: INTERNAL MEDICINE

## 2019-12-09 PROCEDURE — 3700000001 HC ADD 15 MINUTES (ANESTHESIA): Performed by: INTERNAL MEDICINE

## 2019-12-09 PROCEDURE — 3700000000 HC ANESTHESIA ATTENDED CARE: Performed by: INTERNAL MEDICINE

## 2019-12-09 PROCEDURE — 2500000003 HC RX 250 WO HCPCS: Performed by: NURSE ANESTHETIST, CERTIFIED REGISTERED

## 2019-12-09 PROCEDURE — 7100000011 HC PHASE II RECOVERY - ADDTL 15 MIN: Performed by: INTERNAL MEDICINE

## 2019-12-09 PROCEDURE — 2709999900 HC NON-CHARGEABLE SUPPLY: Performed by: INTERNAL MEDICINE

## 2019-12-09 PROCEDURE — 2580000003 HC RX 258: Performed by: ANESTHESIOLOGY

## 2019-12-09 PROCEDURE — 3609010600 HC COLONOSCOPY POLYPECTOMY SNARE/COLD BIOPSY: Performed by: INTERNAL MEDICINE

## 2019-12-09 PROCEDURE — 6360000002 HC RX W HCPCS: Performed by: NURSE ANESTHETIST, CERTIFIED REGISTERED

## 2019-12-09 RX ORDER — SODIUM CHLORIDE 0.9 % (FLUSH) 0.9 %
10 SYRINGE (ML) INJECTION EVERY 12 HOURS SCHEDULED
Status: DISCONTINUED | OUTPATIENT
Start: 2019-12-09 | End: 2019-12-09 | Stop reason: HOSPADM

## 2019-12-09 RX ORDER — LIDOCAINE HYDROCHLORIDE 20 MG/ML
INJECTION, SOLUTION EPIDURAL; INFILTRATION; INTRACAUDAL; PERINEURAL PRN
Status: DISCONTINUED | OUTPATIENT
Start: 2019-12-09 | End: 2019-12-09 | Stop reason: SDUPTHER

## 2019-12-09 RX ORDER — PROPOFOL 10 MG/ML
INJECTION, EMULSION INTRAVENOUS PRN
Status: DISCONTINUED | OUTPATIENT
Start: 2019-12-09 | End: 2019-12-09 | Stop reason: SDUPTHER

## 2019-12-09 RX ORDER — SODIUM CHLORIDE 0.9 % (FLUSH) 0.9 %
10 SYRINGE (ML) INJECTION PRN
Status: DISCONTINUED | OUTPATIENT
Start: 2019-12-09 | End: 2019-12-09 | Stop reason: HOSPADM

## 2019-12-09 RX ORDER — SODIUM CHLORIDE 9 MG/ML
INJECTION, SOLUTION INTRAVENOUS CONTINUOUS
Status: DISCONTINUED | OUTPATIENT
Start: 2019-12-09 | End: 2019-12-09 | Stop reason: HOSPADM

## 2019-12-09 RX ADMIN — PROPOFOL 20 MG: 10 INJECTION, EMULSION INTRAVENOUS at 10:46

## 2019-12-09 RX ADMIN — PROPOFOL 20 MG: 10 INJECTION, EMULSION INTRAVENOUS at 10:54

## 2019-12-09 RX ADMIN — PROPOFOL 20 MG: 10 INJECTION, EMULSION INTRAVENOUS at 10:44

## 2019-12-09 RX ADMIN — PROPOFOL 20 MG: 10 INJECTION, EMULSION INTRAVENOUS at 11:01

## 2019-12-09 RX ADMIN — PROPOFOL 20 MG: 10 INJECTION, EMULSION INTRAVENOUS at 10:53

## 2019-12-09 RX ADMIN — LIDOCAINE HYDROCHLORIDE 100 MG: 20 INJECTION, SOLUTION EPIDURAL; INFILTRATION; INTRACAUDAL; PERINEURAL at 10:42

## 2019-12-09 RX ADMIN — PROPOFOL 10 MG: 10 INJECTION, EMULSION INTRAVENOUS at 11:05

## 2019-12-09 RX ADMIN — PROPOFOL 20 MG: 10 INJECTION, EMULSION INTRAVENOUS at 10:58

## 2019-12-09 RX ADMIN — PROPOFOL 20 MG: 10 INJECTION, EMULSION INTRAVENOUS at 10:59

## 2019-12-09 RX ADMIN — PROPOFOL 20 MG: 10 INJECTION, EMULSION INTRAVENOUS at 10:56

## 2019-12-09 RX ADMIN — SODIUM CHLORIDE: 9 INJECTION, SOLUTION INTRAVENOUS at 09:53

## 2019-12-09 RX ADMIN — PROPOFOL 100 MG: 10 INJECTION, EMULSION INTRAVENOUS at 10:42

## 2019-12-09 RX ADMIN — PROPOFOL 20 MG: 10 INJECTION, EMULSION INTRAVENOUS at 10:52

## 2019-12-09 RX ADMIN — PROPOFOL 20 MG: 10 INJECTION, EMULSION INTRAVENOUS at 10:50

## 2019-12-09 RX ADMIN — PROPOFOL 20 MG: 10 INJECTION, EMULSION INTRAVENOUS at 10:48

## 2019-12-09 RX ADMIN — PROPOFOL 20 MG: 10 INJECTION, EMULSION INTRAVENOUS at 11:03

## 2019-12-09 ASSESSMENT — PAIN SCALES - GENERAL
PAINLEVEL_OUTOF10: 0

## 2019-12-09 ASSESSMENT — PAIN - FUNCTIONAL ASSESSMENT: PAIN_FUNCTIONAL_ASSESSMENT: 0-10

## 2019-12-23 ENCOUNTER — OFFICE VISIT (OUTPATIENT)
Dept: INTERNAL MEDICINE CLINIC | Age: 54
End: 2019-12-23
Payer: COMMERCIAL

## 2019-12-23 VITALS
SYSTOLIC BLOOD PRESSURE: 128 MMHG | WEIGHT: 315 LBS | HEIGHT: 72 IN | DIASTOLIC BLOOD PRESSURE: 84 MMHG | HEART RATE: 72 BPM | BODY MASS INDEX: 42.66 KG/M2

## 2019-12-23 DIAGNOSIS — R10.13 DYSPEPSIA: Primary | ICD-10-CM

## 2019-12-23 DIAGNOSIS — R73.9 HYPERGLYCEMIA: ICD-10-CM

## 2019-12-23 DIAGNOSIS — E78.2 MIXED HYPERLIPIDEMIA: ICD-10-CM

## 2019-12-23 DIAGNOSIS — R10.13 EPIGASTRIC PAIN: ICD-10-CM

## 2019-12-23 DIAGNOSIS — I10 ESSENTIAL HYPERTENSION: ICD-10-CM

## 2019-12-23 PROCEDURE — 99214 OFFICE O/P EST MOD 30 MIN: CPT | Performed by: INTERNAL MEDICINE

## 2019-12-23 RX ORDER — NORTRIPTYLINE HYDROCHLORIDE 10 MG/1
10 CAPSULE ORAL NIGHTLY
Qty: 30 CAPSULE | Refills: 0 | Status: SHIPPED | OUTPATIENT
Start: 2019-12-23 | End: 2020-01-23

## 2019-12-23 RX ORDER — DICYCLOMINE HYDROCHLORIDE 10 MG/1
10 CAPSULE ORAL
COMMUNITY
End: 2020-12-18 | Stop reason: SDUPTHER

## 2019-12-23 ASSESSMENT — ENCOUNTER SYMPTOMS
NAUSEA: 0
SHORTNESS OF BREATH: 0
WHEEZING: 0
VOMITING: 0
CONSTIPATION: 0

## 2019-12-30 ENCOUNTER — TELEPHONE (OUTPATIENT)
Dept: INTERNAL MEDICINE CLINIC | Age: 54
End: 2019-12-30

## 2019-12-30 RX ORDER — DILTIAZEM HYDROCHLORIDE 180 MG/1
CAPSULE, COATED, EXTENDED RELEASE ORAL
Qty: 90 CAPSULE | Refills: 3 | Status: SHIPPED | OUTPATIENT
Start: 2019-12-30 | End: 2020-05-13 | Stop reason: SDUPTHER

## 2019-12-30 RX ORDER — DILTIAZEM HYDROCHLORIDE 180 MG/1
CAPSULE, EXTENDED RELEASE ORAL
Qty: 90 CAPSULE | Refills: 0 | OUTPATIENT
Start: 2019-12-30

## 2019-12-30 NOTE — TELEPHONE ENCOUNTER
Migel with [address] is requesting a prescription refill of cartia 180 mg qd for patient.

## 2020-01-09 ENCOUNTER — TELEPHONE (OUTPATIENT)
Dept: INTERNAL MEDICINE CLINIC | Age: 55
End: 2020-01-09

## 2020-01-09 RX ORDER — AMOXICILLIN AND CLAVULANATE POTASSIUM 875; 125 MG/1; MG/1
1 TABLET, FILM COATED ORAL 2 TIMES DAILY
Qty: 20 TABLET | Refills: 0 | Status: SHIPPED | OUTPATIENT
Start: 2020-01-09 | End: 2020-01-19

## 2020-02-24 RX ORDER — ATORVASTATIN CALCIUM 20 MG/1
20 TABLET, FILM COATED ORAL DAILY
Qty: 30 TABLET | Refills: 5 | Status: SHIPPED | OUTPATIENT
Start: 2020-02-24 | End: 2020-05-13 | Stop reason: SDUPTHER

## 2020-03-23 ENCOUNTER — OFFICE VISIT (OUTPATIENT)
Dept: INTERNAL MEDICINE CLINIC | Age: 55
End: 2020-03-23
Payer: COMMERCIAL

## 2020-03-23 VITALS
HEART RATE: 90 BPM | WEIGHT: 315 LBS | BODY MASS INDEX: 42.66 KG/M2 | DIASTOLIC BLOOD PRESSURE: 86 MMHG | SYSTOLIC BLOOD PRESSURE: 136 MMHG | HEIGHT: 72 IN

## 2020-03-23 PROCEDURE — 99214 OFFICE O/P EST MOD 30 MIN: CPT | Performed by: INTERNAL MEDICINE

## 2020-03-23 SDOH — ECONOMIC STABILITY: FOOD INSECURITY: WITHIN THE PAST 12 MONTHS, YOU WORRIED THAT YOUR FOOD WOULD RUN OUT BEFORE YOU GOT MONEY TO BUY MORE.: NEVER TRUE

## 2020-03-23 SDOH — ECONOMIC STABILITY: TRANSPORTATION INSECURITY
IN THE PAST 12 MONTHS, HAS LACK OF TRANSPORTATION KEPT YOU FROM MEETINGS, WORK, OR FROM GETTING THINGS NEEDED FOR DAILY LIVING?: NO

## 2020-03-23 SDOH — ECONOMIC STABILITY: TRANSPORTATION INSECURITY
IN THE PAST 12 MONTHS, HAS THE LACK OF TRANSPORTATION KEPT YOU FROM MEDICAL APPOINTMENTS OR FROM GETTING MEDICATIONS?: NO

## 2020-03-23 SDOH — ECONOMIC STABILITY: FOOD INSECURITY: WITHIN THE PAST 12 MONTHS, THE FOOD YOU BOUGHT JUST DIDN'T LAST AND YOU DIDN'T HAVE MONEY TO GET MORE.: NEVER TRUE

## 2020-03-23 SDOH — ECONOMIC STABILITY: INCOME INSECURITY: HOW HARD IS IT FOR YOU TO PAY FOR THE VERY BASICS LIKE FOOD, HOUSING, MEDICAL CARE, AND HEATING?: NOT HARD AT ALL

## 2020-03-23 ASSESSMENT — ENCOUNTER SYMPTOMS
NAUSEA: 0
TROUBLE SWALLOWING: 0
WHEEZING: 0
ABDOMINAL PAIN: 0
BLOOD IN STOOL: 0
SHORTNESS OF BREATH: 0
VOMITING: 0
VOICE CHANGE: 0

## 2020-03-23 NOTE — PROGRESS NOTES
and transverse colon. Status post multiple polypectomies and biopsies.  COLONOSCOPY N/A 12/9/2019    COLONOSCOPY POLYPECTOMY SNARE/COLD BIOPSY performed by Iris Kerr MD at [facility] procedure    MOHS SURGERY  12/20/2017    Left temple    TONSILLECTOMY  1970     Social History     Socioeconomic History    Marital status:      Spouse name: Kennedy Burdick Number of children: 2    Years of education: None    Highest education level: None   Occupational History    Occupation: Liscenced Counselor     Comment: [employer]   Social Needs    Financial resource strain: Not hard at all   Dwain-Jesus insecurity     Worry: Never true     Inability: Never true    Transportation needs     Medical: No     Non-medical: No   Tobacco Use    Smoking status: Former Smoker     Types: Cigars    Smokeless tobacco: Never Used    Tobacco comment: cigars occas in past   Substance and Sexual Activity    Alcohol use: No     Comment: occas (last drink March 2016)    Drug use: No    Sexual activity: None   Lifestyle    Physical activity     Days per week: None     Minutes per session: None    Stress: None   Relationships    Social connections     Talks on phone: None     Gets together: None     Attends Bahai service: None     Active member of club or organization: None     Attends meetings of clubs or organizations: None     Relationship status: None    Intimate partner violence     Fear of current or ex partner: None     Emotionally abused: None     Physically abused: None     Forced sexual activity: None   Other Topics Concern    None   Social History Narrative    None     Family History   Problem Relation Age of Onset    High Blood Pressure Mother     Stroke Mother     Other Mother         MICKIE    Heart Disease Neg Hx     High Cholesterol Neg Hx        Review of Systems   HENT: Negative for trouble swallowing and voice change. Eyes: Negative for visual disturbance. Respiratory: Negative for shortness of breath and wheezing. Cardiovascular: Negative for chest pain and palpitations. Gastrointestinal: Negative for abdominal pain, blood in stool, nausea and vomiting. Neurological: Negative for dizziness and light-headedness. OBJECTIVE:  Pulse Readings from Last 4 Encounters:   03/23/20 90   12/23/19 72   12/09/19 67   11/22/19 72     Wt Readings from Last 4 Encounters:   03/23/20 (!) 327 lb (148.3 kg)   12/23/19 (!) 325 lb (147.4 kg)   12/09/19 (!) 320 lb (145.2 kg)   11/22/19 (!) 320 lb (145.2 kg)     BP Readings from Last 4 Encounters:   03/23/20 136/86   12/23/19 128/84   12/09/19 (!) 144/85   12/09/19 (!) 140/92     Physical Exam  Vitals signs and nursing note reviewed. Constitutional:       Appearance: He is obese. Eyes:      General: No scleral icterus. Conjunctiva/sclera: Conjunctivae normal.   Cardiovascular:      Rate and Rhythm: Normal rate and regular rhythm. Pulses: Normal pulses. Heart sounds: Normal heart sounds. Pulmonary:      Effort: Pulmonary effort is normal.      Breath sounds: Normal breath sounds. Abdominal:      General: Bowel sounds are normal.      Tenderness: There is no abdominal tenderness. Musculoskeletal:      Comments: No definite pedal edema. Slight puffiness in the both legs. Neurological:      General: No focal deficit present. Mental Status: He is alert and oriented to person, place, and time.    Psychiatric:         Mood and Affect: Mood normal.         CBC:   Lab Results   Component Value Date    WBC 5.9 11/22/2019    HGB 14.3 11/22/2019    HCT 41.1 11/22/2019     11/22/2019     CMP:  Lab Results   Component Value Date     11/22/2019    K 4.4 11/22/2019     11/22/2019    CO2 25 11/22/2019    ANIONGAP 12 11/22/2019    GLUCOSE 104 11/22/2019    BUN 14 11/22/2019    CREATININE 1.1 11/22/2019    GFRAA >60 11/22/2019    CALCIUM 9.3 11/22/2019    PROT 6.7 11/22/2019    LABALBU 4.4 11/22/2019    AGRATIO 1.7 07/09/2019    BILITOT 0.3 11/22/2019    ALKPHOS 97 11/22/2019    ALT 32 11/22/2019    AST 19 11/22/2019    GLOB 2.9 07/09/2019     URINALYSIS:  Lab Results   Component Value Date    GLUCOSEU Negative 11/22/2019    KETUA Negative 11/22/2019    SPECGRAV 1.029 11/22/2019    BLOODU Negative 11/22/2019    PHUR 6.0 11/22/2019    PROTEINU Negative 11/22/2019    NITRU Negative 11/22/2019    LEUKOCYTESUR Negative 11/22/2019    LABMICR Not Indicated 11/22/2019    URINETYPE [place] 11/22/2019     HBA1C:   Lab Results   Component Value Date    LABA1C 5.4 08/24/2018    .3 08/24/2018     MICRO/ALB:   Lab Results   Component Value Date    LABMICR Not Indicated 11/22/2019     LIPID:  Lab Results   Component Value Date    CHOL 216 05/25/2018    TRIG 136 05/25/2018    HDL 37 11/22/2019    LDLCALC 141 11/22/2019    LABVLDL 31 11/22/2019     TSH:   Lab Results   Component Value Date    TSHREFLEX 3.56 11/22/2019     PSA:   Lab Results   Component Value Date    PSA 0.86 11/22/2019        ASSESSMENT/PLAN:  Assessment/Plan:  Yomaira Skaggs was seen today for 3 month follow-up and leg swelling. Diagnoses and all orders for this visit:    Mixed hyperlipidemia  -     Lipid, Fasting; Future  -     HEPATIC FUNCTION PANEL; Future  May need Lipitor dose adjustment. Generalized anxiety disorder and irritable bowel syndrome currently stable with nortriptyline. Essential hypertension   The current medical regimen is effective;  continue present plan and medications. Need for prophylactic vaccination and inoculation against varicella  -     zoster recombinant adjuvanted vaccine [facility]) 50 MCG/0.5ML SUSR injection; Inject 0.5 mLs into the muscle once for 1 dose 50 MCG IM then repeat 2-6 months. Irritable bowel syndrome, unspecified type  Symptom has been well controlled. Continue nortriptyline. He also use Bentyl as well. Status post colonoscopy.           Orders Placed This Encounter   Procedures    Lipid,

## 2020-03-31 ENCOUNTER — TELEPHONE (OUTPATIENT)
Dept: INTERNAL MEDICINE CLINIC | Age: 55
End: 2020-03-31

## 2020-03-31 RX ORDER — HYDROCHLOROTHIAZIDE 25 MG/1
12.5 TABLET ORAL EVERY MORNING
Qty: 45 TABLET | Refills: 0 | Status: SHIPPED | OUTPATIENT
Start: 2020-03-31 | End: 2020-05-13 | Stop reason: SDUPTHER

## 2020-03-31 RX ORDER — LOSARTAN POTASSIUM 100 MG/1
100 TABLET ORAL DAILY
Qty: 90 TABLET | Refills: 0 | Status: SHIPPED | OUTPATIENT
Start: 2020-03-31 | End: 2020-05-13 | Stop reason: SDUPTHER

## 2020-03-31 NOTE — TELEPHONE ENCOUNTER
Pt called and stated that losartan-hydrochlorothiazide (HYZAAR) 100-12.5 MG per tablet  is on back order and would like to now if this medicationcan be prescribed separately Please send to [facility] [phone] [phone]

## 2020-04-13 RX ORDER — METHYLPREDNISOLONE 4 MG/1
TABLET ORAL
Qty: 1 KIT | Refills: 0 | Status: SHIPPED | OUTPATIENT
Start: 2020-04-13 | End: 2020-04-19

## 2020-05-19 ENCOUNTER — TELEMEDICINE (OUTPATIENT)
Dept: INTERNAL MEDICINE CLINIC | Age: 55
End: 2020-05-19
Payer: COMMERCIAL

## 2020-05-19 PROCEDURE — 99213 OFFICE O/P EST LOW 20 MIN: CPT | Performed by: INTERNAL MEDICINE

## 2020-05-19 RX ORDER — METHOCARBAMOL 750 MG/1
750 TABLET, FILM COATED ORAL 3 TIMES DAILY
Qty: 30 TABLET | Refills: 0 | Status: SHIPPED | OUTPATIENT
Start: 2020-05-19 | End: 2021-02-02 | Stop reason: SDUPTHER

## 2020-05-19 RX ORDER — PREDNISONE 50 MG/1
50 TABLET ORAL DAILY
Qty: 5 TABLET | Refills: 0 | Status: SHIPPED | OUTPATIENT
Start: 2020-05-19 | End: 2021-05-11 | Stop reason: SDUPTHER

## 2020-05-19 ASSESSMENT — ENCOUNTER SYMPTOMS
WHEEZING: 0
BACK PAIN: 1
SHORTNESS OF BREATH: 0
BOWEL INCONTINENCE: 0
ABDOMINAL PAIN: 0

## 2020-05-19 NOTE — PROGRESS NOTES
2020    TELEHEALTH EVALUATION -- Audio/Visual (During SBSUG-55 public health emergency)    HPI:    Guy Segura (:  1965) has requested an audio/video evaluation for the following concern(s):  Back Pain   This is a new problem. Episode onset: following  lot of lifting during movement last 4 days. The problem occurs constantly. The problem has been gradually improving since onset. The pain is present in the lumbar spine. The quality of the pain is described as aching. Radiates to: left buttock. The pain is at a severity of 6/10. The pain is moderate. The symptoms are aggravated by bending and twisting. Pertinent negatives include no abdominal pain, bladder incontinence, bowel incontinence, chest pain, dysuria, fever, headaches, leg pain, numbness, paresis, paresthesias, pelvic pain, perianal numbness, tingling, weakness or weight loss. Review of Systems   Constitutional: Negative for fever, unexpected weight change and weight loss. Respiratory: Negative for shortness of breath and wheezing. Cardiovascular: Negative for chest pain. Gastrointestinal: Negative for abdominal pain and bowel incontinence. Genitourinary: Negative for bladder incontinence, dysuria, flank pain, frequency and pelvic pain. Musculoskeletal: Positive for back pain. Negative for neck pain and neck stiffness. Neurological: Negative for dizziness, tingling, weakness, numbness, headaches and paresthesias. Prior to Visit Medications    Medication Sig Taking?  Authorizing Provider   methocarbamol (ROBAXIN) 750 MG tablet Take 1 tablet by mouth 3 times daily for 10 days Yes Celsa Mitchell MD   predniSONE (DELTASONE) 50 MG tablet Take 1 tablet by mouth daily for 5 days Yes Celsa Mitchell MD   citalopram (CELEXA) 20 MG tablet TAKE 1 TABLET BY MOUTH ONCE DAILY Yes Celsa Mitchell MD   dilTIAZem (CARDIZEM CD) 180 MG extended release capsule TAKE 1 CAPSULE BY MOUTH ONE TIME A DAY Yes Celsa Mitchell MD nortriptyline (PAMELOR) 10 MG capsule Take 1 capsule by mouth nightly Yes Criss Ceron MD   atorvastatin (LIPITOR) 20 MG tablet Take 1 tablet by mouth daily Yes Criss Ceron MD   lansoprazole (PREVACID) 30 MG delayed release capsule Take 1 capsule by mouth daily Yes Criss Ceron MD   losartan (COZAAR) 100 MG tablet Take 1 tablet by mouth daily Yes Criss Ceron MD   hydroCHLOROthiazide (HYDRODIURIL) 25 MG tablet Take 0.5 tablets by mouth every morning Yes Criss Ceron MD   dicyclomine (BENTYL) 10 MG capsule Take 10 mg by mouth 4 times daily (before meals and nightly) Per GI Yes Historical Provider, MD   aspirin  MG EC tablet Take 1 tablet by mouth daily  Patient taking differently: Take 81 mg by mouth daily  Yes David Hinton MD   Blood Pressure KIT 1 kit by Does not apply route daily  Vanessa Crowell, APRN - CNP   BiPAP Machine MISC by Does not apply route At least 5 hours  Historical Provider, MD       Social History     Tobacco Use    Smoking status: Former Smoker     Types: Cigars    Smokeless tobacco: Never Used    Tobacco comment: cigars occas in past   Substance Use Topics    Alcohol use: No     Comment: occas (last drink March 2016)    Drug use: No        Past Medical History:   Diagnosis Date    Anxiety     Atrial fibrillation ([facility] [address]     Basal cell carcinoma of left temple region 12/20/2017    Colon polyp 12/09/2019    Poor Prep. Recommend repeat colonoscopy in 1 year with 2 days prep-Dr. Viridiana Haynes Fatty liver 12/5/2019    GERD (gastroesophageal reflux disease)     Hypertension     Mixed hyperlipidemia 4/25/2017    Obstructive sleep apnea (adult) (pediatric)    ,   Past Surgical History:   Procedure Laterality Date    ABLATION OF DYSRHYTHMIC FOCUS  10/2015    COLONOSCOPY  01/01/2011    COLONOSCOPY  05/24/2017    Dr. Schneider Catching multiple polyps and the ascending colon and transverse colon. Status post multiple polypectomies and biopsies.     COLONOSCOPY N/A 12/9/2019    COLONOSCOPY POLYPECTOMY SNARE/COLD BIOPSY performed by Maico Lacy MD at [facility] procedure    MOHS SURGERY  12/20/2017    Left temple   [facility]   ,   Social History     Tobacco Use    Smoking status: Former Smoker     Types: Cigars    Smokeless tobacco: Never Used    Tobacco comment: cigars occas in past   Substance Use Topics    Alcohol use: No     Comment: occas (last drink March 2016)    Drug use: No   ,   Family History   Problem Relation Age of Onset    High Blood Pressure Mother     Stroke Mother     Other Mother         MICKIE    Heart Disease Neg Hx     High Cholesterol Neg Hx        PHYSICAL EXAMINATION:  [ INSTRUCTIONS:  \"[x]\" Indicates a positive item  \"[]\" Indicates a negative item  -- DELETE ALL ITEMS NOT EXAMINED]  Vital Signs: (As obtained by patient/caregiver or practitioner observation)    Constitutional: [x] Appears well-developed and well-nourished [x] No apparent distress      [] Abnormal-   Mental status  [] Alert and awake  [] Oriented to person/place/time []Able to follow commands      Eyes:  EOM    []  Normal  [] Abnormal-  Sclera  [x]  Normal  [] Abnormal -         Discharge [x]  None visible  [] Abnormal -    HENT:   [] Normocephalic, atraumatic.   [] Abnormal   [] Mouth/Throat: Mucous membranes are moist.     External Ears [] Normal  [] Abnormal-     Neck: [x] No visualized mass     Pulmonary/Chest: [x] Respiratory effort normal.  [x] No visualized signs of difficulty breathing or respiratory distress        [] Abnormal-      Musculoskeletal:   [x] Normal gait with no signs of ataxia         [] Normal range of motion of neck        [] Abnormal-       Neurological:        [x] No Facial Asymmetry (Cranial nerve 7 motor function) (limited exam to video visit)          [x] No gaze palsy        [] Abnormal-         Skin:        [] No significant exanthematous lesions or discoloration noted on facial skin         []

## 2020-06-04 ENCOUNTER — TELEPHONE (OUTPATIENT)
Dept: INTERNAL MEDICINE CLINIC | Age: 55
End: 2020-06-04

## 2020-06-05 ENCOUNTER — TELEPHONE (OUTPATIENT)
Dept: INTERNAL MEDICINE CLINIC | Age: 55
End: 2020-06-05

## 2020-06-23 ENCOUNTER — VIRTUAL VISIT (OUTPATIENT)
Dept: INTERNAL MEDICINE CLINIC | Age: 55
End: 2020-06-23
Payer: COMMERCIAL

## 2020-06-23 PROCEDURE — 99214 OFFICE O/P EST MOD 30 MIN: CPT | Performed by: INTERNAL MEDICINE

## 2020-06-23 RX ORDER — TOLTERODINE 4 MG/1
4 CAPSULE, EXTENDED RELEASE ORAL DAILY
Qty: 30 CAPSULE | Refills: 3 | Status: SHIPPED | OUTPATIENT
Start: 2020-06-23 | End: 2020-10-15

## 2020-06-23 RX ORDER — DILTIAZEM HYDROCHLORIDE 240 MG/1
CAPSULE, COATED, EXTENDED RELEASE ORAL
Qty: 90 CAPSULE | Refills: 1 | Status: SHIPPED | OUTPATIENT
Start: 2020-06-23 | End: 2020-12-18 | Stop reason: SDUPTHER

## 2020-06-23 ASSESSMENT — ENCOUNTER SYMPTOMS
SHORTNESS OF BREATH: 0
ABDOMINAL PAIN: 0
WHEEZING: 0
VOMITING: 0
NAUSEA: 0

## 2020-06-23 ASSESSMENT — PATIENT HEALTH QUESTIONNAIRE - PHQ9
SUM OF ALL RESPONSES TO PHQ QUESTIONS 1-9: 0
2. FEELING DOWN, DEPRESSED OR HOPELESS: 0
SUM OF ALL RESPONSES TO PHQ9 QUESTIONS 1 & 2: 0
SUM OF ALL RESPONSES TO PHQ QUESTIONS 1-9: 0
1. LITTLE INTEREST OR PLEASURE IN DOING THINGS: 0

## 2020-06-23 NOTE — PROGRESS NOTES
POLYPECTOMY SNARE/COLD BIOPSY performed by Derek Baeza MD at [facility] procedure    MOHS SURGERY  12/20/2017    Left temple   [facility]   ,   Social History     Tobacco Use    Smoking status: Former Smoker     Types: Cigars    Smokeless tobacco: Never Used    Tobacco comment: cigars occas in past   Substance Use Topics    Alcohol use: No     Comment: occas (last drink March 2016)    Drug use: No   ,   Family History   Problem Relation Age of Onset    High Blood Pressure Mother     Stroke Mother     Other Mother         MICKIE    Heart Disease Neg Hx     High Cholesterol Neg Hx      Wt Readings from Last 3 Encounters:   03/23/20 (!) 327 lb (148.3 kg)   12/23/19 (!) 325 lb (147.4 kg)   12/09/19 (!) 320 lb (145.2 kg)       PHYSICAL EXAMINATION:  [ INSTRUCTIONS:  \"[x]\" Indicates a positive item  \"[]\" Indicates a negative item  -- DELETE ALL ITEMS NOT EXAMINED]  Vital Signs: (As obtained by patient/caregiver or practitioner observation)         Constitutional: [x] Appears well-developed and well-nourished [x] No apparent distress      [] Abnormal-   Mental status  [x] Alert and awake  [x] Oriented to person/place/time []Able to follow commands      Eyes:  EOM    []  Normal  [] Abnormal-  Sclera  [x]  Normal  [] Abnormal -         Discharge [x]  None visible  [] Abnormal -    HENT:   [] Normocephalic, atraumatic.   [] Abnormal   [] Mouth/Throat: Mucous membranes are moist.     External Ears [] Normal  [] Abnormal-     Neck: [x] No visualized mass     Pulmonary/Chest: [x] Respiratory effort normal.  [] No visualized signs of difficulty breathing or respiratory distress        [] Abnormal-      Musculoskeletal:   [x] Normal gait with no signs of ataxia         [] Normal range of motion of neck        [] Abnormal-       Neurological:        [x] No Facial Asymmetry (Cranial nerve 7 motor function) (limited exam to video visit)          [x] No gaze palsy        [] Abnormal-         Skin:        [] No significant exanthematous lesions or discoloration noted on facial skin         [] Abnormal-            Psychiatric:       [x] Normal Affect [x] No Hallucinations        [] Abnormal-     Other pertinent observable physical exam findings-     ASSESSMENT/PLAN:  1. Urinary urgency  Trial of  - tolterodine (DETROL LA) 4 MG extended release capsule; Take 1 capsule by mouth daily  Dispense: 30 capsule; Refill: 3  Patient is advised to call me back in the next few weeks if symptoms does not improve or any new concerning symptoms. Will consider referral to a urologist.  2. Essential hypertension  Blood pressure has not been well controlled. He will continue current medications along with diet exercise weight loss. Increase- dilTIAZem (CARDIZEM CD) 240 MG extended release capsule; TAKE 1 CAPSULE BY MOUTH ONE TIME A DAY  Dispense: 90 capsule; Refill: 1  - BASIC METABOLIC PANEL; Future    3. Mixed hyperlipidemia  He will have his fasting lab work done. 4. Generalized anxiety disorder  Symptoms has been in remission with current Celexa    Return in about 3 months (around 9/23/2020). Nick Borjas is a 47 y.o. male being evaluated by a Virtual Visit (video visit) encounter to address concerns as mentioned above. A caregiver was present when appropriate. Due to this being a TeleHealth encounter (During [address] emergency), evaluation of the following organ systems was limited: Vitals/Constitutional/EENT/Resp/CV/GI//MS/Neuro/Skin/Heme-Lymph-Imm. Pursuant to the emergency declaration under the [address] [place] [address] authority and the [employer] Act, this Virtual Visit was conducted with patient's (and/or legal guardian's) consent, to reduce the patient's risk of exposure to COVID-19 and provide necessary medical care.   The patient (and/or legal guardian) has also been advised to contact this office for worsening conditions or problems, and seek emergency medical treatment and/or call 911 if deemed necessary. Patient identification was verified at the start of the visit: Yes    Total time spent on this encounter: Not billed by time    Services were provided through a video synchronous discussion virtually to substitute for in-person clinic visit. Patient and provider were located at their individual homes. --Celsa Mitchell MD on 6/23/2020 at 9:06 AM    An electronic signature was used to authenticate this note. Documentation was done using voice recognition dragon software. Every effort was made to ensure accuracy; however, inadvertent  Unintentional computerized transcription errors may be present.

## 2020-08-18 ENCOUNTER — TELEPHONE (OUTPATIENT)
Dept: CARDIOLOGY CLINIC | Age: 55
End: 2020-08-18

## 2020-08-18 NOTE — TELEPHONE ENCOUNTER
Call [facility]. He is agreeable to wearing an MCOT. Episodes are not constant.  Scheduled a f/u with NPSR advised him to press his symptom button when he is symptomatic so we can determine the exact arrhythmia

## 2020-08-18 NOTE — TELEPHONE ENCOUNTER
Pt states he has been having arrhythmia last 3 wks and having sob. Pt's bp is running high 149/93.  Please advise where pt can be added and call pt to schedule

## 2020-08-21 PROCEDURE — 93228 REMOTE 30 DAY ECG REV/REPORT: CPT | Performed by: INTERNAL MEDICINE

## 2020-09-18 NOTE — PROGRESS NOTES
[facility]   Electrophysiology  Sunil Jansen, APRN-CNP  Attending EP: Dr. Angeline Buenrostro    Date: 10/15/2020  I had the privilege of visiting Bhavya Hoover in the office. Chief Complaint:   Chief Complaint   Patient presents with    Atrial Fibrillation    Shortness of Breath    Dizziness     History of Present Illness: History obtained from patient and medical record. Bhavya Hoover is 54 y.o. male with a past medical history of HTN, HLD, MICKIE, and atrial fibrillation. S/p cryo-ablation for PVI, CVTI flutter and AVNRT ablation (10/29/15)    -Interval history: Today, Bhavya Hoover is being seen for follow up. Last seen by EP in 2018. He has been dealing with dizziness and SOB the last few months. Pt reports these symptoms are different than when he had atrial fibrillation back in 2015. We reviewed his event monitor, which showed symptomatic PACs. No AF or SVT. We discussed the different options for treating the arrhythmia. 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 He consumes multiple cups of coffee per day. Pt reports that he wears his CPAP compliantly at night. Denies having chest pain, palpitations, shortness of breath, orthopnea/PND, cough, or dizziness at the time of this visit. With regard to medication therapy the patient has been compliant with prescribed regimen. They have tolerated therapy to date. Allergies:  No Known Allergies    Home Medications:  Prior to Visit Medications    Medication Sig Taking?  Authorizing Provider   dilTIAZem (CARDIZEM CD) 240 MG extended release capsule TAKE 1 CAPSULE BY MOUTH ONE TIME A DAY Yes Hemal Rodriguez MD   citalopram (CELEXA) 20 MG tablet TAKE 1 TABLET BY MOUTH ONCE DAILY Yes Hemal Rodriguez MD   atorvastatin (LIPITOR) 20 MG tablet Take 1 tablet by mouth daily Yes Hemal Rodriguez MD   lansoprazole (PREVACID) 30 MG delayed release capsule Take 1 capsule by mouth daily Yes Sheila Cisneros MD   losartan (COZAAR) 100 MG tablet Take 1 tablet by mouth daily Yes Sheila Cisneros MD   hydroCHLOROthiazide (HYDRODIURIL) 25 MG tablet Take 0.5 tablets by mouth every morning Yes Sheila Cisneros MD   BiPAP Machine MISC by Does not apply route At least 5 hours Yes Historical Provider, MD   aspirin  MG EC tablet Take 1 tablet by mouth daily  Patient taking differently: Take 81 mg by mouth daily  Yes Isaiah Galindo MD   tolterodine (DETROL LA) 4 MG extended release capsule Take 1 capsule by mouth daily  Patient not taking: Reported on 10/15/2020  Sheila Cisneros MD   nortriptyline (PAMELOR) 10 MG capsule Take 1 capsule by mouth nightly  Patient not taking: Reported on 10/15/2020  DANIEL Arambula MD   dicyclomine (BENTYL) 10 MG capsule Take 10 mg by mouth 4 times daily (before meals and nightly) Per GI  Historical Provider, MD   Blood Pressure KIT 1 kit by Does not apply route daily  Patient not taking: Reported on 10/15/2020  DAVID Carnes - CNP      Past Medical History:  Past Medical History:   Diagnosis Date    Anxiety     Atrial fibrillation ([facility] [address]     Basal cell carcinoma of left temple region 12/20/2017    Colon polyp 12/09/2019    Poor Prep. Recommend repeat colonoscopy in 1 year with 2 days prep-Dr. Sheree Ramirez Fatty liver 12/5/2019    GERD (gastroesophageal reflux disease)     Hypertension     IBS (irritable bowel syndrome) 2017    Mixed hyperlipidemia 4/25/2017    Obstructive sleep apnea (adult) (pediatric)      Past Surgical History:    has a past surgical history that includes Tonsillectomy (1970); Colonoscopy (01/01/2011); eye surgery; ablation of dysrhythmic focus (10/2015); Colonoscopy (05/24/2017); Mohs surgery (12/20/2017); and Colonoscopy (N/A, 12/9/2019). Social History:  Reviewed. reports that he has quit smoking. His smoking use included cigars.  He has never used smokeless tobacco. He reports that he does not drink alcohol or use drugs. Family History:  Reviewed. family history includes High Blood Pressure in his mother; Other in his mother; Stroke in his mother. Review of System:  · Constitutional: Negative for fever, night sweats, chills, weight changes, or weakness  · Skin: Negative for rash, dry skin, pruritus, bruising, bleeding, blood clots, or changes in skin pigment  · HEENT: Negative for vision changes, ringing in the ears, sore throat, dysphagia, or swollen lymph nodes  · Respiratory: Reviewed in HPI  · Cardiovascular: Reviewed in HPI  · Gastrointestinal: Negative for abdominal pain, N/V/D, constipation, or black/tarry stools  · Genito-Urinary: Negative for dysuria, incontinence, urgency, or hematuria  · Musculoskeletal: Negative for joint swelling, muscle pain, or injuries  · Neurological/Psych: Negative for confusion, seizures, dizziness, headaches, balance issues or TIA-like symptoms. No anxiety, depression, or insomnia    Physical Examination:  There were no vitals filed for this visit. Wt Readings from Last 3 Encounters:   10/15/20 (!) 341 lb 6.4 oz (154.9 kg)   03/23/20 (!) 327 lb (148.3 kg)   12/23/19 (!) 325 lb (147.4 kg)     Constitutional: Cooperative and in no apparent distress, and appears well nourished  Skin: Warm and pink; no pallor, cyanosis, bruising, or clubbing  HEENT: Symmetric and normocephalic. PERRL, EOM intact. Conjunctiva pink with clear sclera. Mucus membranes pink and moist. Teeth intact. Thyroid smooth without nodules or goiter  Respiratory: Respirations symmetric and unlabored. Lungs clear to auscultation bilaterally, no wheezing, rhonchi, or crackles  Cardiovascular:  Regular rate and rhythm. S1/S2 present without murmurs, rubs, or gallops. Peripheral pulses 2+, capillary refill < 3 seconds. No elevation of JVP. No peripheral edema  Gastrointestinal: Abdomen soft and round. Bowel sounds normoactive in all quadrants without tenderness or masses.  + Obese  Musculoskeletal: Bilateral upper and lower extremity strength 5/5 with full ROM. Neurological/Psych: Awake and orientated to person, place and time. Calm affect, appropriate mood. Pertinent labs, diagnostic, device, and imaging results reviewed as a part of this visit    LABS    CBC:   Lab Results   Component Value Date    WBC 5.9 11/22/2019    HGB 14.3 11/22/2019    HCT 41.1 11/22/2019    MCV 86.4 11/22/2019     11/22/2019     BMP:   Lab Results   Component Value Date    CREATININE 1.1 11/22/2019    BUN 14 11/22/2019     11/22/2019    K 4.4 11/22/2019     11/22/2019    CO2 25 11/22/2019     CrCl cannot be calculated (Patient's most recent lab result is older than the maximum 120 days allowed. ). Thyroid: No results found for: TSH, G8QTEPW, P1SPIMX, THYROIDAB  Lipid Panel:   Lab Results   Component Value Date    CHOL 216 05/25/2018    HDL 37 11/22/2019    TRIG 136 05/25/2018     LFTs:  Lab Results   Component Value Date    ALT 32 11/22/2019    AST 19 11/22/2019    ALKPHOS 97 11/22/2019    BILITOT 0.3 11/22/2019     Coags:   Lab Results   Component Value Date    PROTIME 14.5 (H) 05/04/2016    INR 1.27 (H) 05/04/2016    APTT 41.9 (H) 05/04/2016       ECG: 10/15/2020  - NSR, rate 77, QTc 379    KARLI: 10/15   Overall left ventricular function is normal. Ejection fraction is visually estimated to be 55-60 %. The aortic valve is tricuspid. No aortic insufficiency. There is small mobile echo density on the right coronary cusp. Echo: 10/15   -Normal left ventricle size, wall thickness and systolic function with an estimated ejection fraction of 55%. No regional wall motion abnormalities are seen. E/e'= 11.5 .   -The aortic valve appears sclerotic but opens well. -Mitral annular calcification is present. Trivial mitral regurgitation is present.   -Trivial tricuspid regurgitation.   -Trivial pulmonic regurgitation present.     GXT: 5/16   Normal myocardial perfusion study.     Normal LV size and systolic function. Event Monitor: 9/20  NSR. PAC burden 1% (Symptoms with PACs)  Average HR 85, low 61, high 120    Assessment:    1. Paroxysmal Atrial Fibrillation  - S/p cryo-ablation for PVI, CVTI flutter and AVNRT ablation (10/29/15)    - Currently in NSR  - Continue cardizem 240 mg QD    - [id number] score:1 (HTN) ; [id number] Vasc score and anticoagulation discussed. High risk for stroke and thromboembolism. Anticoagulation is recommended. Risk of bleeding was discussed.  ~ On ASA 81 mg QD    - Afib risk factors including age, HTN, obesity, inactivity and MICKIE were discussed with patient. Risk factor modification recommended   ~ TSH 3.56 (11/19)       - Treatment options including cardioversion, rate control strategy, antiarrhythmics, anticoagulation and possible ablation were discussed with patient. Risks, benefits and alternative of each treatment options were explained. All questions answered    2. HTN  - Controlled: Goal <130/80  - Continue current medications  - Encouraged to monitor and log BP readings at home, then bring log to next visit  - Discussed importance of low sodium diet, weight control and exercise    3. PACs   - Correlated to his symptoms (SOB, palpitations, skipped beats)   - On cardizem 240 mg QD   - Will add metoprolol 25 mg BID   - If symptoms continue or worsen, will consider GXT and echo    - If symptoms worsen despite adequate medical therapy. Recommend EPS/ RFA. The risks, benefits and alternatives of the ablation procedure were discussed with the patient.    ~ Pt will consider if no improvement    4. MICKIE  - Stable: Uses CPAP  - Encourage to use CPAP to prevent long term effects of untreated MICKIE    5. Obesity  - Body mass index is 43.83 kg/m². - Complicating assessment and treatment.  Placing patient at risk for multiple co-morbidities as well as early death and contributing to the patient's presentation  - Discussed weight loss and patient was encouraged to reduce calorie intake and increase exercise    Plan:  1. Start metoprolol 25 mg twice per day  2. Monitor blood pressure 2-3 days per week  3. Start to exercise more frequently  4. Reduce caffeine intake    F/U: Follow-up with EP in 4 months  -Call [facility] at [phone] with any questions    Diet & Exercise:   The patient is counseled to follow a low salt diet to assure blood pressure remains controlled for cardiovascular risk factor modification   The patient is counseled to avoid excess caffeine, and energy drinks as this may exacerbated ectopy and arrhythmia   The patient is counseled to lose weight to control cardiovascular risk factors   Exercise program discussed: To improve overall cardiovascular health, the patient is instructed to increase cardiovascular related activities with a goal of 150 min/week of moderate level activity or 10,000 steps per day. Encouraged to perform as much activity as tolerated    Quality Metrics  1. Tobacco Cessation Counseling: N/A  2. Retake of BP if >140/90: Yes   3. Documentation to PCP: Note sent to PCP office visit  4. CAD patient on anti-platelet: N/A   5.   CAD patient on STATIN therapy: N/A   6. Patient with history of CHF and atrial fibrillation on anticoagulation: No due to low risk     I have addressed the patient's cardiac risk factors and adjusted pharmacologic treatment as needed. In addition, I have reinforced the need for patient directed risk factor modification. I independently reviewed the MCOT and ECG    All questions and concerns were addressed with the patient. Alternatives to treatment were discussed. Thank you for allowing to us to participate in the care of Abraham Jean.     Gabriela Moore, APRN-CNP  [facility]   Office: [phone]

## 2020-10-15 ENCOUNTER — OFFICE VISIT (OUTPATIENT)
Dept: CARDIOLOGY CLINIC | Age: 55
End: 2020-10-15
Payer: COMMERCIAL

## 2020-10-15 VITALS
WEIGHT: 315 LBS | DIASTOLIC BLOOD PRESSURE: 88 MMHG | HEART RATE: 74 BPM | BODY MASS INDEX: 40.43 KG/M2 | HEIGHT: 74 IN | SYSTOLIC BLOOD PRESSURE: 140 MMHG

## 2020-10-15 PROBLEM — I49.1 PAC (PREMATURE ATRIAL CONTRACTION): Status: ACTIVE | Noted: 2020-10-15

## 2020-10-15 PROCEDURE — 99214 OFFICE O/P EST MOD 30 MIN: CPT | Performed by: NURSE PRACTITIONER

## 2020-10-15 PROCEDURE — 93000 ELECTROCARDIOGRAM COMPLETE: CPT | Performed by: NURSE PRACTITIONER

## 2020-10-15 RX ORDER — ASPIRIN 81 MG/1
81 TABLET ORAL DAILY
Qty: 90 TABLET | Refills: 1
Start: 2020-10-15

## 2020-10-15 NOTE — PATIENT INSTRUCTIONS
1. Start metoprolol 25 mg twice per day  2. Monitor blood pressure 2-3 days per week  3.  Start to exercise more frequently  4. Reduce caffeine intake

## 2020-12-18 ENCOUNTER — OFFICE VISIT (OUTPATIENT)
Dept: INTERNAL MEDICINE CLINIC | Age: 55
End: 2020-12-18
Payer: COMMERCIAL

## 2020-12-18 VITALS
SYSTOLIC BLOOD PRESSURE: 148 MMHG | HEIGHT: 74 IN | TEMPERATURE: 97.1 F | WEIGHT: 315 LBS | BODY MASS INDEX: 40.43 KG/M2 | HEART RATE: 84 BPM | DIASTOLIC BLOOD PRESSURE: 100 MMHG

## 2020-12-18 PROCEDURE — 99214 OFFICE O/P EST MOD 30 MIN: CPT | Performed by: INTERNAL MEDICINE

## 2020-12-18 RX ORDER — CITALOPRAM 40 MG/1
40 TABLET ORAL DAILY
Qty: 90 TABLET | Refills: 0 | Status: SHIPPED | OUTPATIENT
Start: 2020-12-18 | End: 2021-04-19

## 2020-12-18 RX ORDER — SPIRONOLACTONE 25 MG/1
12.5 TABLET ORAL DAILY
Qty: 45 TABLET | Refills: 0 | Status: SHIPPED | OUTPATIENT
Start: 2020-12-18 | End: 2021-01-15

## 2020-12-18 RX ORDER — HYDROCHLOROTHIAZIDE 25 MG/1
12.5 TABLET ORAL EVERY MORNING
Qty: 45 TABLET | Refills: 3 | Status: SHIPPED | OUTPATIENT
Start: 2020-12-18

## 2020-12-18 RX ORDER — ATORVASTATIN CALCIUM 20 MG/1
20 TABLET, FILM COATED ORAL DAILY
Qty: 90 TABLET | Refills: 3 | Status: SHIPPED | OUTPATIENT
Start: 2020-12-18 | End: 2021-01-07 | Stop reason: DRUGHIGH

## 2020-12-18 RX ORDER — DICYCLOMINE HYDROCHLORIDE 10 MG/1
10 CAPSULE ORAL 4 TIMES DAILY PRN
Qty: 120 CAPSULE | Refills: 1 | Status: SHIPPED | OUTPATIENT
Start: 2020-12-18

## 2020-12-18 RX ORDER — DILTIAZEM HYDROCHLORIDE 240 MG/1
CAPSULE, COATED, EXTENDED RELEASE ORAL
Qty: 90 CAPSULE | Refills: 3 | Status: SHIPPED | OUTPATIENT
Start: 2020-12-18 | End: 2021-06-25 | Stop reason: SDUPTHER

## 2020-12-18 ASSESSMENT — ENCOUNTER SYMPTOMS
TROUBLE SWALLOWING: 0
VOMITING: 0
WHEEZING: 0
SHORTNESS OF BREATH: 0
PHOTOPHOBIA: 0
NAUSEA: 0
DIARRHEA: 0
CONSTIPATION: 0

## 2020-12-18 NOTE — PROGRESS NOTES
Maryan Valenzuelalim  1965  male  54 y.o. SUBJECTIVE:       Chief Complaint   Patient presents with    Hypertension    Bloated     aware F/U colonoscopy due       HPI:  Hypertension:    Maryan Pappas returns for follow up of hypertension. Tolerating medications well and taking them as directed. Patient feels his blood pressure has been running high. No symptoms (denies chest pain,dyspnea,edema or TIA's or blurred vision) concerning for end organ damage are present. History of anxiety depression. Currently going through more stress related to illness with mother-in-law and other family member. He feels current medicine 20 mg of Celexa has not been helping. History of irritable bowel syndrome still feel fullness and occasional aching pain in the epigastrium. Continue to follow-up with gastroenterologist.  History of endoscopy as well as colonoscopy    History of hyperlipidemia. He denies any muscle pain fatigue joint alcohol taking atorvastatin. History of obstructive sleep apnea. Patient uses BiPAP most of the time while in sleep        Past Medical History:   Diagnosis Date    Anxiety     Atrial fibrillation ([facility] [address]     Basal cell carcinoma of left temple region 12/20/2017    Colon polyp 12/09/2019    Poor Prep. Recommend repeat colonoscopy in 1 year with 2 days prep-Dr. Shanel Solis Fatty liver 12/5/2019    GERD (gastroesophageal reflux disease)     Hypertension     IBS (irritable bowel syndrome) 2017    Mixed hyperlipidemia 4/25/2017    Obstructive sleep apnea (adult) (pediatric)      Past Surgical History:   Procedure Laterality Date    ABLATION OF DYSRHYTHMIC FOCUS  10/2015    COLONOSCOPY  01/01/2011    COLONOSCOPY  05/24/2017    Dr. Smith Late multiple polyps and the ascending colon and transverse colon. Status post multiple polypectomies and biopsies.     COLONOSCOPY N/A 12/9/2019    COLONOSCOPY POLYPECTOMY SNARE/COLD BIOPSY performed by Ana Maria Donahue MD at [address]  EYE SURGERY      lasik procedure    MOHS SURGERY  12/20/2017    Left temple    TONSILLECTOMY  1970     Social History     Socioeconomic History    Marital status:      Spouse name: Tae Robertson Number of children: 2    Years of education: None    Highest education level: None   Occupational History    Occupation: Liscenced Counselor     Comment: [employer]   Social Needs    Financial resource strain: Not hard at all   [place] insecurity     Worry: Never true     Inability: Never true    Transportation needs     Medical: No     Non-medical: No   Tobacco Use    Smoking status: Former Smoker     Types: Cigars    Smokeless tobacco: Never Used    Tobacco comment: cigars occas in past   Substance and Sexual Activity    Alcohol use: No     Comment: occas (last drink March 2016)    Drug use: No    Sexual activity: None   Lifestyle    Physical activity     Days per week: None     Minutes per session: None    Stress: None   Relationships    Social connections     Talks on phone: None     Gets together: None     Attends Rastafari service: None     Active member of club or organization: None     Attends meetings of clubs or organizations: None     Relationship status: None    Intimate partner violence     Fear of current or ex partner: None     Emotionally abused: None     Physically abused: None     Forced sexual activity: None   Other Topics Concern    None   Social History Narrative    None     Family History   Problem Relation Age of Onset    High Blood Pressure Mother     Stroke Mother     Other Mother         MICKIE    Heart Disease Neg Hx     High Cholesterol Neg Hx        Review of Systems   Constitutional: Negative for diaphoresis and unexpected weight change. HENT: Negative for trouble swallowing. Eyes: Negative for photophobia and visual disturbance. Respiratory: Negative for shortness of breath and wheezing. Cardiovascular: Negative for chest pain and palpitations. Gastrointestinal: Negative for constipation, diarrhea, nausea and vomiting. Genitourinary: Negative for difficulty urinating and frequency. Neurological: Negative for dizziness and light-headedness. Psychiatric/Behavioral: Negative for hallucinations and suicidal ideas. OBJECTIVE:  Pulse Readings from Last 4 Encounters:   12/18/20 84   10/15/20 74   03/23/20 90   12/23/19 72     Wt Readings from Last 4 Encounters:   12/18/20 (!) 340 lb (154.2 kg)   10/15/20 (!) 341 lb 6.4 oz (154.9 kg)   03/23/20 (!) 327 lb (148.3 kg)   12/23/19 (!) 325 lb (147.4 kg)     BP Readings from Last 4 Encounters:   12/18/20 (!) 148/100   10/15/20 (!) 140/88   03/23/20 136/86   12/23/19 128/84     Physical Exam  Vitals signs and nursing note reviewed. Constitutional:       Appearance: He is obese. He is not ill-appearing. Eyes:      Conjunctiva/sclera: Conjunctivae normal.   Cardiovascular:      Rate and Rhythm: Normal rate and regular rhythm. Pulses: Normal pulses. Heart sounds: Normal heart sounds. Pulmonary:      Effort: Pulmonary effort is normal.      Breath sounds: Normal breath sounds. Abdominal:      General: Bowel sounds are normal.      Tenderness: There is no abdominal tenderness. There is no guarding or rebound. Musculoskeletal:      Comments: Mild bilateral leg puffiness   Skin:     General: Skin is warm and dry. Neurological:      General: No focal deficit present. Mental Status: He is alert and oriented to person, place, and time.          CBC:   Lab Results   Component Value Date    WBC 5.9 11/22/2019    HGB 14.3 11/22/2019    HCT 41.1 11/22/2019     11/22/2019     CMP:  Lab Results   Component Value Date     11/22/2019    K 4.4 11/22/2019     11/22/2019    CO2 25 11/22/2019    ANIONGAP 12 11/22/2019    GLUCOSE 104 11/22/2019    BUN 14 11/22/2019    CREATININE 1.1 11/22/2019    GFRAA >60 11/22/2019    CALCIUM 9.3 11/22/2019    PROT 6.7 11/22/2019 LABALBU 4.4 11/22/2019    AGRATIO 1.7 07/09/2019    BILITOT 0.3 11/22/2019    ALKPHOS 97 11/22/2019    ALT 32 11/22/2019    AST 19 11/22/2019    GLOB 2.9 07/09/2019     URINALYSIS:  Lab Results   Component Value Date    GLUCOSEU Negative 11/22/2019    KETUA Negative 11/22/2019    SPECGRAV 1.029 11/22/2019    BLOODU Negative 11/22/2019    PHUR 6.0 11/22/2019    PROTEINU Negative 11/22/2019    NITRU Negative 11/22/2019    LEUKOCYTESUR Negative 11/22/2019    LABMICR Not Indicated 11/22/2019    URINETYPE Cleancatch 11/22/2019     HBA1C:   Lab Results   Component Value Date    LABA1C 5.4 08/24/2018    .3 08/24/2018     MICRO/ALB:   Lab Results   Component Value Date    LABMICR Not Indicated 11/22/2019     LIPID:  Lab Results   Component Value Date    CHOL 216 05/25/2018    TRIG 136 05/25/2018    HDL 37 11/22/2019    LDLCALC 141 11/22/2019    LABVLDL 31 11/22/2019     TSH:   Lab Results   Component Value Date    TSHREFLEX 3.56 11/22/2019     PSA:   Lab Results   Component Value Date    PSA 0.86 11/22/2019        ASSESSMENT/PLAN:    Miguel Angel Corona was seen today for hypertension and bloated. Diagnoses and all orders for this visit:    Essential hypertension  -     dilTIAZem (CARDIZEM CD) 240 MG extended release capsule; TAKE 1 CAPSULE BY MOUTH ONE TIME A DAY  -     hydroCHLOROthiazide (HYDRODIURIL) 25 MG tablet; Take 0.5 tablets by mouth every morning  -     spironolactone (ALDACTONE) 25 MG tablet; Take 0.5 tablets by mouth daily  -     BASIC METABOLIC PANEL; Future  Discussed use, benefit, and side effects of prescribed medications. Pt voiced understanding. He will get his basic metabolic panel in 2 weeks. Also agreed to have periodic blood work monitoring while on Aldactone.     Continue low-dose metoprolol  Continue losartan    He will get basic metabolic panel in 2 weeks  He is advised to do therapeutic lifestyle changes as well  Mixed hyperlipidemia -     atorvastatin (LIPITOR) 20 MG tablet; Take 1 tablet by mouth daily  -     Lipid, Fasting; Future  The patient is asked to make an attempt to improve diet and exercise patterns to aid in medical management of this problem. Generalized anxiety disorder  We will increase-     citalopram (CELEXA) 40 MG tablet; Take 1 tablet by mouth daily    Irritable bowel syndrome, unspecified type  -     dicyclomine (BENTYL) 10 MG capsule;  Take 1 capsule by mouth 4 times daily as needed (cramping) Per GI    Patient is advised to make follow-up appointment with gastroenterologist for history of abnormal colonoscopy        Orders Placed This Encounter   Procedures    BASIC METABOLIC PANEL     Standing Status:   Future     Standing Expiration Date:   12/18/2021    Lipid, Fasting     Standing Status:   Future     Standing Expiration Date:   12/18/2021     Current Outpatient Medications   Medication Sig Dispense Refill    dilTIAZem (CARDIZEM CD) 240 MG extended release capsule TAKE 1 CAPSULE BY MOUTH ONE TIME A DAY 90 capsule 3    hydroCHLOROthiazide (HYDRODIURIL) 25 MG tablet Take 0.5 tablets by mouth every morning 45 tablet 3    dicyclomine (BENTYL) 10 MG capsule Take 1 capsule by mouth 4 times daily as needed (cramping) Per  capsule 1    atorvastatin (LIPITOR) 20 MG tablet Take 1 tablet by mouth daily 90 tablet 3    spironolactone (ALDACTONE) 25 MG tablet Take 0.5 tablets by mouth daily 45 tablet 0    citalopram (CELEXA) 40 MG tablet Take 1 tablet by mouth daily 90 tablet 0    losartan (COZAAR) 100 MG tablet Take 1 tablet by mouth once daily 90 tablet 1    aspirin EC 81 MG EC tablet Take 1 tablet by mouth daily 90 tablet 1    metoprolol tartrate (LOPRESSOR) 25 MG tablet Take 1 tablet by mouth 2 times daily 180 tablet 1    lansoprazole (PREVACID) 30 MG delayed release capsule Take 1 capsule by mouth daily 90 capsule 1    BiPAP Machine MISC by Does not apply route At least 5 hours No current facility-administered medications for this visit. Return in about 4 weeks (around 1/15/2021) for HTN, anxiety. An After Visit Summary was printed and given to the patient. Documentation was done using voice recognition dragon software. Every effort was made to ensure accuracy; however, inadvertent  Unintentional computerized transcription errors may be present.

## 2021-01-06 DIAGNOSIS — I10 ESSENTIAL HYPERTENSION: ICD-10-CM

## 2021-01-06 DIAGNOSIS — E78.2 MIXED HYPERLIPIDEMIA: ICD-10-CM

## 2021-01-06 LAB
ANION GAP SERPL CALCULATED.3IONS-SCNC: 12 MMOL/L (ref 3–16)
BUN BLDV-MCNC: 14 MG/DL (ref 7–20)
CALCIUM SERPL-MCNC: 10.2 MG/DL (ref 8.3–10.6)
CHLORIDE BLD-SCNC: 99 MMOL/L (ref 99–110)
CHOLESTEROL, FASTING: 222 MG/DL (ref 0–199)
CO2: 28 MMOL/L (ref 21–32)
CREAT SERPL-MCNC: 1.1 MG/DL (ref 0.9–1.3)
GFR AFRICAN AMERICAN: >60
GFR NON-AFRICAN AMERICAN: >60
GLUCOSE BLD-MCNC: 118 MG/DL (ref 70–99)
HDLC SERPL-MCNC: 39 MG/DL (ref 40–60)
LDL CHOLESTEROL CALCULATED: 145 MG/DL
POTASSIUM SERPL-SCNC: 4.7 MMOL/L (ref 3.5–5.1)
SODIUM BLD-SCNC: 139 MMOL/L (ref 136–145)
TRIGLYCERIDE, FASTING: 189 MG/DL (ref 0–150)
VLDLC SERPL CALC-MCNC: 38 MG/DL

## 2021-01-07 RX ORDER — ATORVASTATIN CALCIUM 40 MG/1
40 TABLET, FILM COATED ORAL DAILY
Qty: 90 TABLET | Refills: 1 | Status: SHIPPED | OUTPATIENT
Start: 2021-01-07 | End: 2021-07-06

## 2021-01-15 ENCOUNTER — OFFICE VISIT (OUTPATIENT)
Dept: INTERNAL MEDICINE CLINIC | Age: 56
End: 2021-01-15
Payer: COMMERCIAL

## 2021-01-15 VITALS
DIASTOLIC BLOOD PRESSURE: 88 MMHG | HEIGHT: 74 IN | TEMPERATURE: 96.9 F | BODY MASS INDEX: 40.43 KG/M2 | HEART RATE: 84 BPM | SYSTOLIC BLOOD PRESSURE: 138 MMHG | WEIGHT: 315 LBS

## 2021-01-15 DIAGNOSIS — F41.1 GENERALIZED ANXIETY DISORDER: Primary | ICD-10-CM

## 2021-01-15 DIAGNOSIS — I10 ESSENTIAL HYPERTENSION: ICD-10-CM

## 2021-01-15 PROCEDURE — 99213 OFFICE O/P EST LOW 20 MIN: CPT | Performed by: INTERNAL MEDICINE

## 2021-01-15 RX ORDER — SPIRONOLACTONE 25 MG/1
12.5 TABLET ORAL 2 TIMES DAILY
Qty: 45 TABLET | Refills: 1 | Status: SHIPPED | OUTPATIENT
Start: 2021-01-15 | End: 2021-06-25

## 2021-01-15 ASSESSMENT — ENCOUNTER SYMPTOMS
SHORTNESS OF BREATH: 0
WHEEZING: 0
PHOTOPHOBIA: 0
VOICE CHANGE: 0
TROUBLE SWALLOWING: 0

## 2021-01-15 NOTE — PROGRESS NOTES
Elisa Del Rio  1965  male  54 y.o. SUBJECTIVE:       Chief Complaint   Patient presents with    Hypertension    Other     1-18 covid testing, 1-22 colonoscopy       HPI:  Follow-up visit for hypertension. Patient denies chest pain palpitation dizziness. Leg swelling is slightly better. He feels his anxiety stress also slightly better after increasing his Celexa  He denies any suicidal homicidal ideation. Denies manic symptoms    Past Medical History:   Diagnosis Date    Anxiety     Atrial fibrillation ([address]     Basal cell carcinoma of left temple region 12/20/2017    Colon polyp 12/09/2019    Poor Prep. Recommend repeat colonoscopy in 1 year with 2 days prep-Dr. Aaron Husain Fatty liver 12/5/2019    GERD (gastroesophageal reflux disease)     Hypertension     IBS (irritable bowel syndrome) 2017    Mixed hyperlipidemia 4/25/2017    Obstructive sleep apnea (adult) (pediatric)      Past Surgical History:   Procedure Laterality Date    ABLATION OF DYSRHYTHMIC FOCUS  10/2015    COLONOSCOPY  01/01/2011    COLONOSCOPY  05/24/2017    Dr. Fernandez Patrick multiple polyps and the ascending colon and transverse colon. Status post multiple polypectomies and biopsies.     COLONOSCOPY N/A 12/9/2019    COLONOSCOPY POLYPECTOMY SNARE/COLD BIOPSY performed by [facility]i Herman [facility] at [facility] procedure    [facility]    Left temple    TONSILLECTOMY  1970     Social History     Socioeconomic History    Marital status:      Spouse name: Ave Nichols Number of children: 2    Years of education: None    Highest education level: None   Occupational History    Occupation: Liscenced Counselor     Comment: [employer]   Social Needs    Financial resource strain: Not hard at all   [place] insecurity     Worry: Never true     Inability: Never true    Transportation needs     Medical: No     Non-medical: No   Tobacco Use    Smoking status: Former Smoker     Types: Cigars  Smokeless tobacco: Never Used    Tobacco comment: cigars occas in past   Substance and Sexual Activity    Alcohol use: No     Comment: occas (last drink March 2016)    Drug use: No    Sexual activity: None   Lifestyle    Physical activity     Days per week: None     Minutes per session: None    Stress: None   Relationships    Social connections     Talks on phone: None     Gets together: None     Attends Sikhism service: None     Active member of club or organization: None     Attends meetings of clubs or organizations: None     Relationship status: None    Intimate partner violence     Fear of current or ex partner: None     Emotionally abused: None     Physically abused: None     Forced sexual activity: None   Other Topics Concern    None   Social History Narrative    None     Family History   Problem Relation Age of Onset    High Blood Pressure Mother     Stroke Mother     Other Mother         MICKIE    Heart Disease Neg Hx     High Cholesterol Neg Hx        Review of Systems   Constitutional: Negative for diaphoresis and unexpected weight change. HENT: Negative for trouble swallowing and voice change. Eyes: Negative for photophobia and visual disturbance. Respiratory: Negative for shortness of breath and wheezing. Neurological: Negative for dizziness, light-headedness and headaches. OBJECTIVE:  Pulse Readings from Last 4 Encounters:   01/15/21 84   12/18/20 84   10/15/20 74   03/23/20 90     Wt Readings from Last 4 Encounters:   01/15/21 (!) 340 lb (154.2 kg)   12/18/20 (!) 340 lb (154.2 kg)   10/15/20 (!) 341 lb 6.4 oz (154.9 kg)   03/23/20 (!) 327 lb (148.3 kg)     BP Readings from Last 4 Encounters:   01/15/21 138/88   12/18/20 (!) 148/100   10/15/20 (!) 140/88   03/23/20 136/86     Physical Exam  Vitals signs and nursing note reviewed. Constitutional:       Appearance: He is obese. Eyes:      General: No scleral icterus.      Conjunctiva/sclera: Conjunctivae normal. Cardiovascular:      Rate and Rhythm: Normal rate and regular rhythm. Pulses: Normal pulses. Heart sounds: Normal heart sounds. Pulmonary:      Effort: Pulmonary effort is normal.      Breath sounds: Normal breath sounds. Musculoskeletal:      Comments: Bilateral leg/ pretibial puffiness   Neurological:      General: No focal deficit present. Mental Status: He is alert and oriented to person, place, and time.          CBC:   Lab Results   Component Value Date    WBC 5.9 11/22/2019    HGB 14.3 11/22/2019    HCT 41.1 11/22/2019     11/22/2019     CMP:  Lab Results   Component Value Date     01/06/2021    K 4.7 01/06/2021    CL 99 01/06/2021    CO2 28 01/06/2021    ANIONGAP 12 01/06/2021    GLUCOSE 118 01/06/2021    BUN 14 01/06/2021    CREATININE 1.1 01/06/2021    GFRAA >60 01/06/2021    CALCIUM 10.2 01/06/2021    PROT 6.7 11/22/2019    LABALBU 4.4 11/22/2019    AGRATIO 1.7 07/09/2019    BILITOT 0.3 11/22/2019    ALKPHOS 97 11/22/2019    ALT 32 11/22/2019    AST 19 11/22/2019    GLOB 2.9 07/09/2019     URINALYSIS:  Lab Results   Component Value Date    GLUCOSEU Negative 11/22/2019    KETUA Negative 11/22/2019    SPECGRAV 1.029 11/22/2019    BLOODU Negative 11/22/2019    PHUR 6.0 11/22/2019    PROTEINU Negative 11/22/2019    NITRU Negative 11/22/2019    LEUKOCYTESUR Negative 11/22/2019    LABMICR Not Indicated 11/22/2019    URINETYPE Cleancatch 11/22/2019     HBA1C:   Lab Results   Component Value Date    LABA1C 5.4 08/24/2018    .3 08/24/2018     MICRO/ALB:   Lab Results   Component Value Date    LABMICR Not Indicated 11/22/2019     LIPID:  Lab Results   Component Value Date    CHOL 216 05/25/2018    TRIG 136 05/25/2018    HDL 39 01/06/2021    LDLCALC 145 01/06/2021    LABVLDL 38 01/06/2021     TSH:   Lab Results   Component Value Date    TSHREFLEX 3.56 11/22/2019     PSA:   Lab Results   Component Value Date    PSA 0.86 11/22/2019        ASSESSMENT/PLAN: Stevie Dobbins was seen today for hypertension and other. Diagnoses and all orders for this visit:    Generalized anxiety disorder  Continue current Celexa 40 mg/day  Essential hypertension  Patient will continue current diltiazem, hydrochlorothiazide, losartan and metoprolol.  -     BASIC METABOLIC PANEL; Future  Increase-     spironolactone (ALDACTONE) 25 MG tablet; Take 0.5 tablets by mouth 2 times daily    Diet lifestyle changes. He will get his blood work done in 4 weeks           Orders Placed This Encounter   Procedures    BASIC METABOLIC PANEL     Standing Status:   Future     Standing Expiration Date:   3/15/2021     Current Outpatient Medications   Medication Sig Dispense Refill    spironolactone (ALDACTONE) 25 MG tablet Take 0.5 tablets by mouth 2 times daily 45 tablet 1    atorvastatin (LIPITOR) 40 MG tablet Take 1 tablet by mouth daily 90 tablet 1    dilTIAZem (CARDIZEM CD) 240 MG extended release capsule TAKE 1 CAPSULE BY MOUTH ONE TIME A DAY 90 capsule 3    hydroCHLOROthiazide (HYDRODIURIL) 25 MG tablet Take 0.5 tablets by mouth every morning 45 tablet 3    dicyclomine (BENTYL) 10 MG capsule Take 1 capsule by mouth 4 times daily as needed (cramping) Per  capsule 1    citalopram (CELEXA) 40 MG tablet Take 1 tablet by mouth daily 90 tablet 0    losartan (COZAAR) 100 MG tablet Take 1 tablet by mouth once daily 90 tablet 1    aspirin EC 81 MG EC tablet Take 1 tablet by mouth daily 90 tablet 1    metoprolol tartrate (LOPRESSOR) 25 MG tablet Take 1 tablet by mouth 2 times daily 180 tablet 1    lansoprazole (PREVACID) 30 MG delayed release capsule Take 1 capsule by mouth daily 90 capsule 1    BiPAP Machine MISC by Does not apply route At least 5 hours       No current facility-administered medications for this visit. Return in about 3 months (around 4/15/2021). An After Visit Summary was printed and given to the patient. Documentation was done using voice recognition dragon software. Every effort was made to ensure accuracy; however, inadvertent  Unintentional computerized transcription errors may be present.

## 2021-01-29 DIAGNOSIS — N52.9 ERECTILE DYSFUNCTION, UNSPECIFIED ERECTILE DYSFUNCTION TYPE: Primary | ICD-10-CM

## 2021-01-29 RX ORDER — SILDENAFIL 50 MG/1
50 TABLET, FILM COATED ORAL DAILY PRN
Qty: 10 TABLET | Refills: 2 | Status: SHIPPED | OUTPATIENT
Start: 2021-01-29

## 2021-02-02 DIAGNOSIS — S39.012A BACK STRAIN, INITIAL ENCOUNTER: ICD-10-CM

## 2021-02-02 RX ORDER — METHOCARBAMOL 750 MG/1
750 TABLET, FILM COATED ORAL 3 TIMES DAILY
Qty: 90 TABLET | Refills: 0 | Status: SHIPPED | OUTPATIENT
Start: 2021-02-02 | End: 2021-06-01

## 2021-03-29 DIAGNOSIS — R10.13 EPIGASTRIC PAIN: ICD-10-CM

## 2021-03-29 RX ORDER — LANSOPRAZOLE 30 MG/1
CAPSULE, DELAYED RELEASE ORAL
Qty: 90 CAPSULE | Refills: 0 | Status: SHIPPED | OUTPATIENT
Start: 2021-03-29 | End: 2021-06-25

## 2021-04-16 ENCOUNTER — OFFICE VISIT (OUTPATIENT)
Dept: INTERNAL MEDICINE CLINIC | Age: 56
End: 2021-04-16
Payer: COMMERCIAL

## 2021-04-16 VITALS
DIASTOLIC BLOOD PRESSURE: 92 MMHG | HEART RATE: 84 BPM | SYSTOLIC BLOOD PRESSURE: 142 MMHG | WEIGHT: 315 LBS | HEIGHT: 74 IN | BODY MASS INDEX: 40.43 KG/M2

## 2021-04-16 DIAGNOSIS — F41.1 GENERALIZED ANXIETY DISORDER: ICD-10-CM

## 2021-04-16 DIAGNOSIS — I10 ESSENTIAL HYPERTENSION: Primary | ICD-10-CM

## 2021-04-16 DIAGNOSIS — Z12.5 SCREENING FOR PROSTATE CANCER: ICD-10-CM

## 2021-04-16 DIAGNOSIS — E78.2 MIXED HYPERLIPIDEMIA: ICD-10-CM

## 2021-04-16 PROCEDURE — 99214 OFFICE O/P EST MOD 30 MIN: CPT | Performed by: INTERNAL MEDICINE

## 2021-04-16 ASSESSMENT — ENCOUNTER SYMPTOMS
VOMITING: 0
ABDOMINAL PAIN: 0
VOICE CHANGE: 0
WHEEZING: 0
NAUSEA: 0

## 2021-04-16 ASSESSMENT — PATIENT HEALTH QUESTIONNAIRE - PHQ9
2. FEELING DOWN, DEPRESSED OR HOPELESS: 0
SUM OF ALL RESPONSES TO PHQ9 QUESTIONS 1 & 2: 0
SUM OF ALL RESPONSES TO PHQ QUESTIONS 1-9: 0
SUM OF ALL RESPONSES TO PHQ QUESTIONS 1-9: 0

## 2021-04-16 NOTE — PROGRESS NOTES
sessile polyps.  EYE SURGERY      lasik procedure    MOHS SURGERY  12/20/2017    Left temple    TONSILLECTOMY  1970     Social History     Socioeconomic History    Marital status:      Spouse name: Prudy Cushing Number of children: 2    Years of education: None    Highest education level: None   Occupational History    Occupation: Liscenced Counselor     Comment: [employer]   Social Needs    Financial resource strain: Not hard at all   [place] insecurity     Worry: Never true     Inability: Never true    Transportation needs     Medical: No     Non-medical: No   Tobacco Use    Smoking status: Current Some Day Smoker     Types: Cigars     Start date: 1/1/2011    Smokeless tobacco: Never Used    Tobacco comment: 1 per month --CIG[employer]   Substance and Sexual Activity    Alcohol use: No     Comment: occas (last drink March 2016)    Drug use: No    Sexual activity: None   Lifestyle    Physical activity     Days per week: None     Minutes per session: None    Stress: None   Relationships    Social connections     Talks on phone: None     Gets together: None     Attends Mandaen service: None     Active member of club or organization: None     Attends meetings of clubs or organizations: None     Relationship status: None    Intimate partner violence     Fear of current or ex partner: None     Emotionally abused: None     Physically abused: None     Forced sexual activity: None   Other Topics Concern    None   Social History Narrative    None     Family History   Problem Relation Age of Onset    High Blood Pressure Mother     Stroke Mother     Other Mother         MICKIE    Heart Disease Neg Hx     High Cholesterol Neg Hx        Review of Systems   Constitutional: Negative for diaphoresis and unexpected weight change. HENT: Negative for voice change. Respiratory: Negative for wheezing. Cardiovascular: Negative for chest pain and palpitations.    Gastrointestinal: Negative for abdominal pain, nausea and vomiting. Neurological: Negative for dizziness and light-headedness. OBJECTIVE:  Pulse Readings from Last 4 Encounters:   04/16/21 84   01/15/21 84   12/18/20 84   10/15/20 74     Wt Readings from Last 4 Encounters:   04/16/21 (!) 343 lb (155.6 kg)   01/15/21 (!) 340 lb (154.2 kg)   12/18/20 (!) 340 lb (154.2 kg)   10/15/20 (!) 341 lb 6.4 oz (154.9 kg)     BP Readings from Last 4 Encounters:   04/16/21 (!) 142/92   01/15/21 138/88   12/18/20 (!) 148/100   10/15/20 (!) 140/88     Physical Exam  Vitals signs and nursing note reviewed. Constitutional:       Appearance: He is obese. Eyes:      General: No scleral icterus. Conjunctiva/sclera: Conjunctivae normal.   Neck:      Vascular: No carotid bruit. Cardiovascular:      Rate and Rhythm: Normal rate and regular rhythm. Pulses: Normal pulses. Heart sounds: Normal heart sounds. Pulmonary:      Effort: Pulmonary effort is normal.      Breath sounds: Normal breath sounds. Abdominal:      General: Bowel sounds are normal.   Musculoskeletal:      Right lower leg: No edema. Left lower leg: No edema. Skin:     Capillary Refill: Capillary refill takes less than 2 seconds. Neurological:      General: No focal deficit present. Mental Status: He is alert and oriented to person, place, and time.          CBC:   Lab Results   Component Value Date    WBC 5.9 11/22/2019    HGB 14.3 11/22/2019    HCT 41.1 11/22/2019     11/22/2019     CMP:  Lab Results   Component Value Date     01/06/2021    K 4.7 01/06/2021    CL 99 01/06/2021    CO2 28 01/06/2021    ANIONGAP 12 01/06/2021    GLUCOSE 118 01/06/2021    BUN 14 01/06/2021    CREATININE 1.1 01/06/2021    GFRAA >60 01/06/2021    CALCIUM 10.2 01/06/2021    PROT 6.7 11/22/2019    LABALBU 4.4 11/22/2019    AGRATIO 1.7 07/09/2019    BILITOT 0.3 11/22/2019    ALKPHOS 97 11/22/2019    ALT 32 11/22/2019    AST 19 11/22/2019    GLOB 2.9 07/09/2019     URINALYSIS:  Lab Results Component Value Date    GLUCOSEU Negative 11/22/2019    KETUA Negative 11/22/2019    SPECGRAV 1.029 11/22/2019    BLOODU Negative 11/22/2019    PHUR 6.0 11/22/2019    PROTEINU Negative 11/22/2019    NITRU Negative 11/22/2019    LEUKOCYTESUR Negative 11/22/2019    LABMICR Not Indicated 11/22/2019    URINETYPE [place] 11/22/2019     HBA1C:   Lab Results   Component Value Date    LABA1C 5.4 08/24/2018    .3 08/24/2018     MICRO/ALB:   Lab Results   Component Value Date    LABMICR Not Indicated 11/22/2019     LIPID:  Lab Results   Component Value Date    CHOL 216 05/25/2018    TRIG 136 05/25/2018    HDL 39 01/06/2021    LDLCALC 145 01/06/2021    LABVLDL 38 01/06/2021     TSH:   Lab Results   Component Value Date    TSHREFLEX 3.56 11/22/2019     PSA:   Lab Results   Component Value Date    PSA 0.86 11/22/2019        ASSESSMENT/PLAN:  Assessment/Plan:  Chapincito Stack was seen today for 3 month follow-up. Diagnoses and all orders for this visit:  Obesity associated with multiple medical conditions. Patient goal is to lost at least 5 pounds by next visit. Essential hypertension  Uncontrolled. Patient will restart Aldactone along with current medications.  -     BASIC METABOLIC PANEL; Future  Lab work 1-2 weeks after starting spironolactone    Generalized anxiety disorder  Continue current citalopram.  Mixed hyperlipidemia  -     Lipid, Fasting; Future  -     HEPATIC FUNCTION PANEL; Future  Only on atorvastatin 40 mg/day. The patient is asked to make an attempt to improve diet and exercise patterns to aid in medical management of this problem. Screening for prostate cancer  -     PSA screening;  Future            Orders Placed This Encounter   Procedures    BASIC METABOLIC PANEL     Standing Status:   Future     Standing Expiration Date:   4/16/2022    PSA screening     Standing Status:   Future     Standing Expiration Date:   4/16/2022    Lipid, Fasting     Standing Status:   Future     Standing Expiration Date:   4/16/2022    HEPATIC FUNCTION PANEL     Standing Status:   Future     Standing Expiration Date:   4/16/2022     Current Outpatient Medications   Medication Sig Dispense Refill    lansoprazole (PREVACID) 30 MG delayed release capsule Take 1 capsule by mouth once daily 90 capsule 0    sildenafil (VIAGRA) 50 MG tablet Take 1 tablet by mouth daily as needed for Erectile Dysfunction 10 tablet 2    spironolactone (ALDACTONE) 25 MG tablet Take 0.5 tablets by mouth 2 times daily (Patient taking differently: Take 12.5 mg by mouth 2 times daily Inconsistent--\"felt weird\") 45 tablet 1    atorvastatin (LIPITOR) 40 MG tablet Take 1 tablet by mouth daily 90 tablet 1    dilTIAZem (CARDIZEM CD) 240 MG extended release capsule TAKE 1 CAPSULE BY MOUTH ONE TIME A DAY 90 capsule 3    hydroCHLOROthiazide (HYDRODIURIL) 25 MG tablet Take 0.5 tablets by mouth every morning 45 tablet 3    dicyclomine (BENTYL) 10 MG capsule Take 1 capsule by mouth 4 times daily as needed (cramping) Per  capsule 1    citalopram (CELEXA) 40 MG tablet Take 1 tablet by mouth daily 90 tablet 0    losartan (COZAAR) 100 MG tablet Take 1 tablet by mouth once daily 90 tablet 1    aspirin EC 81 MG EC tablet Take 1 tablet by mouth daily 90 tablet 1    metoprolol tartrate (LOPRESSOR) 25 MG tablet Take 1 tablet by mouth 2 times daily 180 tablet 1    BiPAP Machine MISC by Does not apply route At least 5 hours       No current facility-administered medications for this visit. Return in about 3 months (around 7/16/2021). An After Visit Summary was printed and given to the patient. Documentation was done using voice recognition dragon software. Every effort was made to ensure accuracy; however, inadvertent  Unintentional computerized transcription errors may be present.

## 2021-05-07 DIAGNOSIS — I10 ESSENTIAL HYPERTENSION: ICD-10-CM

## 2021-05-07 LAB
ANION GAP SERPL CALCULATED.3IONS-SCNC: 13 MMOL/L (ref 3–16)
BUN BLDV-MCNC: 14 MG/DL (ref 7–20)
CALCIUM SERPL-MCNC: 9.4 MG/DL (ref 8.3–10.6)
CHLORIDE BLD-SCNC: 105 MMOL/L (ref 99–110)
CO2: 25 MMOL/L (ref 21–32)
CREAT SERPL-MCNC: 1 MG/DL (ref 0.9–1.3)
GFR AFRICAN AMERICAN: >60
GFR NON-AFRICAN AMERICAN: >60
GLUCOSE BLD-MCNC: 129 MG/DL (ref 70–99)
POTASSIUM SERPL-SCNC: 4.1 MMOL/L (ref 3.5–5.1)
SODIUM BLD-SCNC: 143 MMOL/L (ref 136–145)

## 2021-05-11 ENCOUNTER — HOSPITAL ENCOUNTER (OUTPATIENT)
Age: 56
Discharge: HOME | End: 2021-05-11
Payer: COMMERCIAL

## 2021-05-11 ENCOUNTER — HOSPITAL ENCOUNTER (OUTPATIENT)
Dept: GENERAL RADIOLOGY | Age: 56
Discharge: HOME | End: 2021-05-11
Payer: COMMERCIAL

## 2021-05-11 DIAGNOSIS — M54.50 MIDLINE LOW BACK PAIN, UNSPECIFIED CHRONICITY, UNSPECIFIED WHETHER SCIATICA PRESENT: Primary | ICD-10-CM

## 2021-05-11 DIAGNOSIS — M47.816 SPONDYLOSIS OF LUMBAR REGION WITHOUT MYELOPATHY OR RADICULOPATHY: Primary | ICD-10-CM

## 2021-05-11 DIAGNOSIS — M54.50 MIDLINE LOW BACK PAIN, UNSPECIFIED CHRONICITY, UNSPECIFIED WHETHER SCIATICA PRESENT: ICD-10-CM

## 2021-05-11 DIAGNOSIS — S39.012A BACK STRAIN, INITIAL ENCOUNTER: ICD-10-CM

## 2021-05-11 DIAGNOSIS — M47.816 FACET ARTHRITIS OF LUMBAR REGION: ICD-10-CM

## 2021-05-11 PROCEDURE — 72100 X-RAY EXAM L-S SPINE 2/3 VWS: CPT

## 2021-05-11 RX ORDER — PREDNISONE 50 MG/1
50 TABLET ORAL DAILY
Qty: 5 TABLET | Refills: 0 | Status: SHIPPED | OUTPATIENT
Start: 2021-05-11 | End: 2021-05-16

## 2021-05-18 ENCOUNTER — OFFICE VISIT (OUTPATIENT)
Dept: ORTHOPEDIC SURGERY | Age: 56
End: 2021-05-18
Payer: COMMERCIAL

## 2021-05-18 VITALS — WEIGHT: 315 LBS | BODY MASS INDEX: 40.43 KG/M2 | HEIGHT: 74 IN

## 2021-05-18 DIAGNOSIS — M51.36 DDD (DEGENERATIVE DISC DISEASE), LUMBAR: ICD-10-CM

## 2021-05-18 DIAGNOSIS — M54.50 LUMBAR PAIN: ICD-10-CM

## 2021-05-18 DIAGNOSIS — M51.26 LUMBAR DISCOGENIC PAIN SYNDROME: Primary | ICD-10-CM

## 2021-05-18 DIAGNOSIS — M47.816 LUMBAR SPONDYLOSIS: ICD-10-CM

## 2021-05-18 PROCEDURE — 99244 OFF/OP CNSLTJ NEW/EST MOD 40: CPT | Performed by: INTERNAL MEDICINE

## 2021-05-18 RX ORDER — MELOXICAM 15 MG/1
15 TABLET ORAL DAILY
Qty: 30 TABLET | Refills: 1 | Status: SHIPPED | OUTPATIENT
Start: 2021-05-18 | End: 2021-06-22
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
## 2021-05-18 NOTE — LETTER
[address]  [address] [facility] [address]  Phone: [phone]  Fax: [phone]           Cheng Donaldson MD      May 18, 2021     Patient: Jaqui Rodriguez   MR Number: [id number]   YOB: 1965   Date of Visit: 5/18/2021       Dear Dr. Jayashree Paulino ref. provider found: Thank you for referring Loretta Vergara to me for evaluation/treatment. Below are the relevant portions of my assessment and plan of care. Impression: . No diagnosis found. Plan:        ***  The nature of the finding, probable diagnosis and likely treatment was thoroughly discussed with the {gen discussed with:[id number]}. The options, risks, complications, alternative treatment as well as some of the differential diagnosis was discussed. The patient was thoroughly informed and all questions were answered. the patient indicated understanding and satisfaction with the discussion. Orders:      No orders of the defined types were placed in this encounter. Disclaimer: \"This note was dictated with voice recognition software. Though review and correction are routine, we apologize for any errors. \"           If you have questions, please do not hesitate to call me. I look forward to following Jannette Fernández along with you. Sincerely,    MD Cheng Radford MD    CC providers:  Denece Burkitt.  [facility]  [place] [address]  Via [employer] Locally

## 2021-05-25 ENCOUNTER — OFFICE VISIT (OUTPATIENT)
Dept: ORTHOPEDIC SURGERY | Age: 56
End: 2021-05-25
Payer: COMMERCIAL

## 2021-05-25 VITALS — BODY MASS INDEX: 40.43 KG/M2 | WEIGHT: 315 LBS | HEIGHT: 74 IN

## 2021-05-25 DIAGNOSIS — M51.27 HERNIATION OF INTERVERTEBRAL DISC BETWEEN L5 AND S1: ICD-10-CM

## 2021-05-25 DIAGNOSIS — M51.26 LUMBAR DISCOGENIC PAIN SYNDROME: ICD-10-CM

## 2021-05-25 DIAGNOSIS — M47.816 LUMBAR SPONDYLOSIS: ICD-10-CM

## 2021-05-25 DIAGNOSIS — M51.36 DDD (DEGENERATIVE DISC DISEASE), LUMBAR: ICD-10-CM

## 2021-05-25 PROCEDURE — 99214 OFFICE O/P EST MOD 30 MIN: CPT | Performed by: INTERNAL MEDICINE

## 2021-05-25 NOTE — PROGRESS NOTES
Chief Complaint:   Chief Complaint   Patient presents with    Lower Back Pain     a little better, NSAIDs helpful, pain 3/10 today, TR MRI          History of Present Illness:       Patient is a 54 y.o. male returns follow up for the above complaint. The patient was last seen approximately 1 weeksago. The symptoms show no change since the last visit. The patient has had further testing for the problem. In the interim MRI scan was completed which is outlined below in detail    Pain levels 3/10 severity    Although he tolerates sitting typically better than standing his symptoms are most consistent with a discogenic provocative pattern. He denies any lower limb symptoms    He denies any new onset or progressive weakness of the lower extremities he denies any new onset bowel or bladder dysfunction       Past Medical History:        Past Medical History:   Diagnosis Date    Adenomatous polyp of ascending colon     Next colonoscopy 2022. Sessile serrated adenoma, tubular adenoma Dr. Yvonne Amador Adenomatous polyp of transverse colon     Colonoscopy 2020. Multiple polypectomy    Anxiety     Atrial fibrillation (HCC)     Basal cell carcinoma of left temple region 12/20/2017    Colon polyp 12/09/2019    Poor Prep.  Recommend repeat colonoscopy in 1 year with 2 days prep-Dr. Annemarie Ham Fatty liver 12/05/2019    GERD (gastroesophageal reflux disease)     Hypertension     IBS (irritable bowel syndrome) 2017    Mixed hyperlipidemia 04/25/2017    Obstructive sleep apnea (adult) (pediatric)         Present Medications:         Current Outpatient Medications   Medication Sig Dispense Refill    meloxicam (MOBIC) 15 MG tablet Take 1 tablet by mouth daily 30 tablet 1    citalopram (CELEXA) 40 MG tablet Take 1 tablet by mouth once daily 90 tablet 1    lansoprazole (PREVACID) 30 MG delayed release capsule Take 1 capsule by mouth once daily 90 capsule 0    sildenafil (VIAGRA) 50 MG tablet Take 1 tablet by mouth daily Physical Therapy     Referral Priority:   Routine     Referral Type:   Eval and Treat     Referral Reason:   Specialty Services Required     Requested Specialty:   Physical Therapy     Number of Visits Requested:   1           Other Outside Imaging and Testing Personally Reviewed:    MRI LUMBAR SPINE WO CONTRAST    Result Date: 2021  Site: [employer] [facility] #: [id number] #: [id number] Procedure: MR Lumbar Spine w/o Contrast ; Reason for Exam: Dx, lumbar pain This document is confidential medical information. Unauthorized disclosure or use of this information is prohibited by law. If you are not the intended recipient of this document, please advise us by calling immediately [phone]. [employer] [facility] [address] Patient Name: Lashay Juares Case ID: [id number] Patient : 1965 Referring Physician: Ben Bella MD Exam Date: 2021 Exam Description: MR Lumbar Spine w/o Contrast HISTORY:  Worsening back pain. TECHNICAL FACTORS:  Long- and short-axis fat- and water-weighted images were performed. COMPARISON:  None. FINDINGS:  No scoliosis. No fracture seen. No substantial disc space narrowing. The conus terminates at L1. Findings at individual levels demonstrate: L1-2 1 mm retrolisthesis. No neural compression. L2-3 1 mm retrolisthesis. No neural compression. L3-4 minor right facet capsulitis. No neural compression. L4-5 1-2 mm retrolisthesis. 3 mm left lateral annular tear. Minor facet hypertrophy with mild  facet capsulitis. No neural compression. L5-S1 2 mm retrolisthesis. Very shallow broad-based left recess disc protrusion is noncompressive. Minor facet hypertrophy with mild facet capsulitis. Gentle abutment of exiting right more than left L5 nerve roots. No hydronephrosis. No aortic aneurysm retroperitoneal adenopathy. CONCLUSION: L5-S1 2 mm retrolisthesis. Very shallow broad-based left recess disc protrusion is noncompressive.  Minor

## 2021-05-26 NOTE — PROGRESS NOTES
tablet Take 1 tablet by mouth once daily Yes Brii Berger MD   sildenafil (VIAGRA) 50 MG tablet Take 1 tablet by mouth daily as needed for Erectile Dysfunction Yes Brii Berger MD   spironolactone (ALDACTONE) 25 MG tablet Take 0.5 tablets by mouth 2 times daily  Patient taking differently: Take 12.5 mg by mouth 2 times daily Inconsistent--\"felt weird\" Yes Brii Berger MD   atorvastatin (LIPITOR) 40 MG tablet Take 1 tablet by mouth daily Yes Brii Berger MD   dilTIAZem (CARDIZEM CD) 240 MG extended release capsule TAKE 1 CAPSULE BY MOUTH ONE TIME A DAY Yes Brii Berger MD   hydroCHLOROthiazide (HYDRODIURIL) 25 MG tablet Take 0.5 tablets by mouth every morning Yes Brii Berger MD   dicyclomine (BENTYL) 10 MG capsule Take 1 capsule by mouth 4 times daily as needed (cramping) Per GI Yes Brii Berger MD   losartan (COZAAR) 100 MG tablet Take 1 tablet by mouth once daily Yes Brii Berger MD   aspirin EC 81 MG EC tablet Take 1 tablet by mouth daily Yes Melvin Service, APRN - CNP   metoprolol tartrate (LOPRESSOR) 25 MG tablet Take 1 tablet by mouth 2 times daily Yes Melvin Service, APRN - CNP   BiPAP Machine MISC by Does not apply route At least 5 hours Yes Historical Provider, MD      Past Medical History:  Past Medical History:   Diagnosis Date    Adenomatous polyp of ascending colon     Next colonoscopy 2022. Sessile serrated adenoma, tubular adenoma Dr. Thea Crawford Adenomatous polyp of transverse colon     Colonoscopy 2020. Multiple polypectomy    Anxiety     Atrial fibrillation (HCC)     Basal cell carcinoma of left temple region 12/20/2017    Colon polyp 12/09/2019    Poor Prep.  Recommend repeat colonoscopy in 1 year with 2 days prep-Dr. Leonie Hendricks Fatty liver 12/05/2019    GERD (gastroesophageal reflux disease)     Hypertension     IBS (irritable bowel syndrome) 2017    Mixed hyperlipidemia 04/25/2017    Obstructive sleep apnea (adult) (pediatric)      Past Surgical History: has a past surgical history that includes Tonsillectomy (1970); Colonoscopy (01/01/2011); eye surgery; ablation of dysrhythmic focus (10/2015); Colonoscopy (05/24/2017); Mohs surgery (12/20/2017); Colonoscopy (N/A, 12/09/2019); and Colonoscopy (01/22/2021). Social History:  Reviewed. reports that he has been smoking cigars. He started smoking about 10 years ago. He has never used smokeless tobacco. He reports that he does not drink alcohol and does not use drugs. Family History:  Reviewed. family history includes High Blood Pressure in his mother; Other in his mother; Stroke in his mother. Review of System:  · Constitutional: Negative for fever, night sweats, chills, weight changes, or weakness  · Skin: Negative for rash, dry skin, pruritus, bruising, bleeding, blood clots, or changes in skin pigment  · HEENT: Negative for vision changes, ringing in the ears, sore throat, dysphagia, or swollen lymph nodes  · Respiratory: Reviewed in HPI  · Cardiovascular: Reviewed in HPI  · Gastrointestinal: Negative for abdominal pain, N/V/D, constipation, or black/tarry stools  · Genito-Urinary: Negative for dysuria, incontinence, urgency, or hematuria  · Musculoskeletal: Negative for joint swelling, muscle pain, or injuries  · Neurological/Psych: Negative for confusion, seizures, dizziness, headaches, balance issues or TIA-like symptoms. No anxiety, depression, or insomnia    Physical Examination:  Vitals:    06/25/21 1419   BP: (!) 162/99      Wt Readings from Last 3 Encounters:   06/25/21 (!) 343 lb 6.4 oz (155.8 kg)   06/22/21 (!) 343 lb 0.6 oz (155.6 kg)   05/25/21 (!) 343 lb 0.6 oz (155.6 kg)     Constitutional: Cooperative and in no apparent distress, and appears well nourished  Skin: Warm and pink; no pallor, cyanosis, bruising, or clubbing  HEENT: Symmetric and normocephalic. PERRL, EOM intact. Conjunctiva pink with clear sclera. Mucus membranes pink and moist. Teeth intact.  Thyroid smooth without nodules or goiter  Respiratory: Respirations symmetric and unlabored. Lungs clear to auscultation bilaterally, no wheezing, rhonchi, or crackles  Cardiovascular:  Regular rate and rhythm. S1/S2 present without murmurs, rubs, or gallops. Peripheral pulses 2+, capillary refill < 3 seconds. No elevation of JVP. No peripheral edema  Gastrointestinal: Abdomen soft and round. Bowel sounds normoactive in all quadrants without tenderness or masses. + Obese  Musculoskeletal: Bilateral upper and lower extremity strength 5/5 with full ROM. Neurological/Psych: Awake and orientated to person, place and time. Calm affect, appropriate mood. Pertinent labs, diagnostic, device, and imaging results reviewed as a part of this visit    LABS    CBC:   Lab Results   Component Value Date    WBC 5.9 2019    HGB 14.3 2019    HCT 41.1 2019    MCV 86.4 2019     2019     BMP:   Lab Results   Component Value Date    CREATININE 1.0 2021    BUN 14 2021     2021    K 4.1 2021     2021    CO2 25 2021     Estimated Creatinine Clearance: 132 mL/min (based on SCr of 1 mg/dL). Thyroid: No results found for: TSH, B4EIKBZ, S8YRUOU, THYROIDAB  Lipid Panel:   Lab Results   Component Value Date    CHOL 216 2018    HDL 39 2021    TRIG 136 2018     LFTs:  Lab Results   Component Value Date    ALT 32 2019    AST 19 2019    ALKPHOS 97 2019    BILITOT 0.3 2019     Coags:   Lab Results   Component Value Date    PROTIME 14.5 (H) 2016    INR 1.27 (H) 2016    APTT 41.9 (H) 2016       EC2021  - NSR, rate 73, QTc 412    KARLI: 10/15   Overall left ventricular function is normal. Ejection fraction is visually estimated to be 55-60 %. The aortic valve is tricuspid. No aortic insufficiency. There is small mobile echo density on the right coronary cusp.     Echo: 10/15   -Normal left ventricle size, wall thickness and systolic function with an estimated ejection fraction of 55%. No regional wall motion abnormalities are seen. E/e'= 11.5 .   -The aortic valve appears sclerotic but opens well. -Mitral annular calcification is present. Trivial mitral regurgitation is present.   -Trivial tricuspid regurgitation.   -Trivial pulmonic regurgitation present. GXT: 5/16   Normal myocardial perfusion study.     Normal LV size and systolic function. Event Monitor: 9/20  NSR. PAC burden 1% (Symptoms with PACs)  Average HR 85, low 61, high 120    Assessment:    1. Paroxysmal Atrial Fibrillation  - S/p cryo-ablation for PVI, CVTI flutter and AVNRT ablation (10/29/15)    - Currently in NSR  - Continue cardizem 360 mg QD, metoprolol 25 mg BID    - [id number] score:1 (HTN) ; [id number] Vasc score and anticoagulation discussed.   ~ On ASA 81 mg QD. Would consider AC if recurrence noted    - Afib risk factors including age, HTN, obesity, inactivity and MICKIE were discussed with patient. Risk factor modification recommended   ~ TSH 3.56 (11/19)       - Treatment options including cardioversion, rate control strategy, antiarrhythmics, anticoagulation and possible ablation were discussed with patient. Risks, benefits and alternative of each treatment options were explained. All questions answered    2. HTN  - Uncontrolled: Goal <130/80  - Increase CCB  - Encouraged to monitor and log BP readings at home, then bring log to next visit   ~ If remains consistently elevated at home, may need to add amlodipine or hydralazine  - Discussed importance of low sodium diet, weight control and exercise    3. PACs   - Correlated to his symptoms (SOB, palpitations, skipped beats)   - Stable   - Continue CCB,BB    4. MICKIE  - Stable: Uses Bi-PAP  - Encourage to use Bi-PAP to prevent long term effects of untreated MICKIE    5. Obesity  - Body mass index is 44.09 kg/m². - Complicating assessment and treatment.  Placing patient at risk for multiple co-morbidities as well as early death and contributing to the patient's presentation  - Discussed weight loss and patient was encouraged to reduce calorie intake and increase exercise    - Offered referral to weight management, but pt declined for now    Plan:  1. Stop spironolactone  2. Increase cardizem to 360 mg daily  3. Continue to monitor blood pressure at home  4. Work on exercise and weight loss. Call if you want referral to weight management    F/U: Follow-up with EP in 6 months  -Call [facility] at [phone] with any questions    Diet & Exercise:   The patient is counseled to follow a low salt diet to assure blood pressure remains controlled for cardiovascular risk factor modification   The patient is counseled to avoid excess caffeine, and energy drinks as this may exacerbated ectopy and arrhythmia   The patient is counseled to lose weight to control cardiovascular risk factors   Exercise program discussed: To improve overall cardiovascular health, the patient is instructed to increase cardiovascular related activities with a goal of 150 min/week of moderate level activity or 10,000 steps per day. Encouraged to perform as much activity as tolerated    I have addressed the patient's cardiac risk factors and adjusted pharmacologic treatment as needed. In addition, I have reinforced the need for patient directed risk factor modification. I independently reviewed the EKG    All questions and concerns were addressed with the patient. Alternatives to treatment were discussed.      Thank you for allowing to us to participate in the care of Laquita Pang     30-39 minutes spent preparing to see patient including reviewing patient history/prior tests/prior consults, performing a medical exam, counseling and educating patient/family/caregiver, ordering medications/tests/procedures, referring and communicating with PCPs and other pertinent consultants, documenting information in the EMR, independently interpreting results and communicating to family and coordination of patient care.      DAVID Navarrete-CNP  [place]    Office: [phone]

## 2021-06-01 ENCOUNTER — HOSPITAL ENCOUNTER (OUTPATIENT)
Dept: PHYSICAL THERAPY | Age: 56
Setting detail: THERAPIES SERIES
Discharge: HOME | End: 2021-06-01
Payer: COMMERCIAL

## 2021-06-01 PROCEDURE — 97161 PT EVAL LOW COMPLEX 20 MIN: CPT

## 2021-06-01 PROCEDURE — 97110 THERAPEUTIC EXERCISES: CPT

## 2021-06-01 PROCEDURE — 97140 MANUAL THERAPY 1/> REGIONS: CPT

## 2021-06-01 NOTE — PLAN OF CARE
[facility]  [address] [address]  Phone [phone]    Fax [phone]                                                       Physical Therapy Certification    Dear Referring Practitioner: Dr. Melinda Faulkner,    We had the pleasure of evaluating the following patient for physical therapy services at [address] [place] [address]. A summary of our findings can be found in the initial assessment below. This includes our plan of care. If you have any questions or concerns regarding these findings, please do not hesitate to contact me at the office phone number checked above. Thank you for the referral.       Physician Signature:_______________________________Date:__________________  By signing above (or electronic signature), therapists plan is approved by physician      Patient: Vickie Rasheed   : 1965   MRN: [id number]  Referring Physician: Referring Practitioner: Dr. Melinda Faulkner      Evaluation Date: 2021      Medical Diagnosis Information:  Diagnosis: M51.26 (Lumbar discogenic pain)   Treatment Diagnosis: low back pain                                         Insurance information: PT Insurance Information: [employer]/5000 deductible/60 visits     Precautions/ Contra-indications: none    C-SSRS Triggered by Intake questionnaire (Past 2 wk assessment):   [x] No, Questionnaire did not trigger screening.   [] Yes, Patient intake triggered further evaluation      [] C-SSRS Screening completed  [] PCP notified via Plan of Care  [] Emergency services notified     Latex Allergy:  [x]NO      []YES  Preferred Language for Healthcare:   [x]English       []Other:      SUBJECTIVE: Patient stated complaint: hx of low back pain from working out, saw a chiro recovered. Reoccurs more frequently. 3 weeks ago was lifting down and felt his back go, B pain into L>R buttock.  Saw Dr. Maren Tirado was given a dose pack, and referred to Dr. Olive Hernández given Meloxicam, significant relief. Follow up at the end of this month. NOW Dull pain with sit to stand transfers and bed mobility L>R. Dull pain with prolonged standing, complete relief with sitting. Denies significant strength deficits.     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     Living Status/Prior Level of Function: Independent with ADLs and IADLs    OBJECTIVE:     ROM     Lumbar Flexion Limited*    Lumbar Extension relief     LEFT RIGHT   Lumbar sidebend WNL Limited *   Lumbar Quadrant     Thoracic Rotation      LEFT RIGHT   HIP Flex [facility] WFL   HIP Abd     HIP ER [facility] WFL   HIP IR [facility] WFL   Knee Flex     Knee ext     Hamstring length     Piriformis length     Tyrone Test          Strength  LEFT RIGHT   ALL NORMAL []      MfA     TrA     HIP Flexors (L1-2) 4    HIP Abductors     HIP extension     Knee EXT (L3) 4    Knee Flex (S1)     Ankle DF (L4)     Ankle PF (S2)     Great Toe Ext (L5)         Reflexes  Normal Abnormal Comments   ALL NORMAL []       S1-2 Seated achilles [] [x] Hard to elicit L    V7-9 Prone knee bend [] []    L3-4 Patellar tendon [x] []    C5-6 Biceps [] []    C6 Brachioradialis [] []    C7-8 Triceps [] []      Sensation:    [x]Dermatomes:   [] Normal   [] Abnormal     Balance:     Joint mobility:    []Normal    [x]Hypo- L1-L4   []Hyper    Palpation: moderate tenderness to palpation lumbar paraspinals, multifidi, glute med, glute min    Functional Mobility/Transfers: pain with sit to stand transfers, log roll for bed mobility    Posture: WNL    Bandages/Dressings/Incisions: NA    Gait: (include devices/WB status) WNL      Neural dynamic tension testing Normal Abnormal Comments   Slump Test  X Inc tension w/L    SLR   X Pos L   Femoral nerve (L2-4) X       Orthopedic Special Tests:    Normal Abnormal N/A Comments   Toe walk   X      Heel Walk X      Fwd Bend-aberrant or innominate mvmt) X      Trendelenburg X      Kemps/Quadrant       Deepak       DEEPTI/Woody X      Hip scour       ASLR  X     Crossed SLR X      Supine to sit       Thigh thrust       SI distraction/compression       PA/Spring       Prone Instability test       Prone knee bend       Sacral Spring/thrust                                     [x] Patient history, allergies, meds reviewed. Medical chart reviewed. See intake form. Review Of Systems (ROS):  [x]Performed Review of systems (Integumentary, CardioPulmonary, Neurological) by intake and observation. Intake form has been scanned into medical record. Patient has been instructed to contact their primary care physician regarding ROS issues if not already being addressed at this time.       Co-morbidities/Complexities (which will affect course of rehabilitation):   []None           Arthritic conditions   []Rheumatoid arthritis (M05.9)  []Osteoarthritis (M19.91)   Cardiovascular conditions   [x]Hypertension (I10)  []Hyperlipidemia (E78.5)  []Angina pectoris (I20)  []Atherosclerosis (I70)   Musculoskeletal conditions   [x]Disc pathology   []Congenital spine pathologies   []Prior surgical intervention  []Osteoporosis (M81.8)  []Osteopenia (M85.8)   Endocrine conditions   []Hypothyroid (E03.9)  []Hyperthyroid Gastrointestinal conditions   []Constipation (X33.21)   Metabolic conditions   []Morbid obesity (E66.01)  [x]Diabetes type 1(E10.65) or 2 (E11.65)   []Neuropathy (G60.9)     Pulmonary conditions   []Asthma (J45)  []Coughing   []COPD (J44.9)   Psychological Disorders  []Anxiety (F41.9)  []Depression (F32.9)   []Other:   []Other: Barriers to/and or personal factors that will affect rehab potential:              [x]Age  [x]Sex              [x]Motivation/Lack of Motivation                        [x]Co-Morbidities              []Cognitive Function, education/learning barriers              []Environmental, home barriers              []profession/work barriers  []past PT/medical experience  []other:  Justification:     Falls Risk Assessment (30 days):   [x] Falls Risk assessed and no intervention required.   [] Falls Risk assessed and Patient requires intervention due to being higher risk   TUG score (>12s at risk):     [] Falls education provided, including         ASSESSMENT:  Functional Impairments:     [x]Noted lumbar/proximal hip hypomobility   []Noted lumbosacral and/or generalized hypermobility   [x]Decreased Lumbosacral/hip/LE functional ROM   [x]Decreased core/proximal hip strength and neuromuscular control    [x]Decreased LE functional strength    []Abnormal reflexes/sensation/myotomal/dermatomal deficits  [x]Reduced balance/proprioceptive control    []other:      Functional Activity Limitations (from functional questionnaire and intake)   []Reduced ability to tolerate prolonged functional positions   [x]Reduced ability or difficulty with changes of positions or transfers between positions   []Reduced ability to maintain good posture and demonstrate good body mechanics with sitting, bending, and lifting   []Reduced ability to sleep   [] Reduced ability or tolerance with driving and/or computer work   [x]Reduced ability to perform lifting, reaching, carrying tasks   [x]Reduced ability to squat   []Reduced ability to forward bend   [x]Reduced ability to ambulate prolonged functional periods/distances/surfaces   []Reduced ability to ascend/descend stairs   []other:       Participation Restrictions   [x]Reduced participation in self care activities   [x]Reduced participation in home management activities   [x]Reduced participation in work activities   [x]Reduced participation in social activities. [x]Reduced participation in sport/recreation activities. Classification:   [x]Signs/symptoms consistent with Lumbar instability/stabilization subgroup. []Signs/symptoms consistent with Lumbar mobilization/manipulation subgroup, myotomes and dermatomes intact. Meets manipulation criteria. [x]Signs/symptoms consistent with Lumbar direction specific/centralization subgroup   []Signs/symptoms consistent with Lumbar traction subgroup     []Signs/symptoms consistent with lumbar facet dysfunction   []Signs/symptoms consistent with lumbar stenosis type dysfunction   []Signs/symptoms consistent with nerve root involvement including myotome & dermatome dysfunction   []Signs/symptoms consistent with post-surgical status including: decreased ROM, strength and function.    []signs/symptoms consistent with pathology which may benefit from Dry needling     []other:      Prognosis/Rehab Potential:      [x]Excellent   []Good    []Fair   []Poor    Tolerance of evaluation/treatment:    [x]Excellent   []Good    []Fair   []Poor     Physical Therapy Evaluation Complexity Justification  [] A history of present problem with:  [x] no personal factors and/or comorbidities that impact the plan of care;  []1-2 personal factors and/or comorbidities that impact the plan of care  []3 personal factors and/or comorbidities that impact the plan of care  [] An examination of body systems using standardized tests and measures addressing any of the following: body structures and functions (impairments), activity limitations, and/or participation restrictions;:  [x] a total of 1-2 or more elements   [] a total of 3 or more elements   [] a total of 4 or more elements   [] A clinical presentation with:  [x] stable and/or uncomplicated characteristics   [] evolving clinical presentation with changing characteristics  [] unstable and unpredictable characteristics;   [] Clinical decision making of [x] low, [] moderate, [] high complexity using standardized patient assessment instrument and/or measurable assessment of functional outcome. [x] EVAL (LOW) 52650 (typically 30 minutes face-to-face)  [] EVAL (MOD) 49899 (typically 30 minutes face-to-face)  [] EVAL (HIGH) 16006 (typically 45 minutes face-to-face)  [] RE-EVAL     PLAN: Begin PT focusing on: proximal hip mobilizations, LB mobs, LB core activation, proximal hip activation, and HEP    Frequency/Duration:  1-2 days per week for 6-8 Weeks:  Interventions:  [x]  Therapeutic exercise including: strength training, ROM, for LE, Glutes and core   [x]  NMR activation and proprioception for glutes , LE and Core   [x]  Manual therapy as indicated for Hip complex, LE and spine to include: Dry Needling/IASTM, STM, PROM, Gr I-IV mobilizations, manipulation. [x]  Modalities as needed that may include: thermal agents, E-stim, Biofeedback, US, iontophoresis as indicated  [x]  Patient education on joint protection, postural re-education, activity modification, progression of HEP. HEP instruction: (see scanned forms)    GOALS:  Patient stated goal: \"no back pain, get back into the gym\"   [] Progressing: [] Met: [x] Not Met: [] Adjusted    Therapist goals for Patient:   Short Term Goals: To be achieved in: 2 weeks  1. Independent in HEP and progression per patient tolerance, in order to prevent re-injury. [] Progressing: [] Met: [x] Not Met: [] Adjusted  2. Patient will have a decrease in pain to facilitate improvement in movement, function, and ADLs as indicated by Functional Deficits. [] Progressing: [] Met: [x] Not Met: [] Adjusted    Long Term Goals: To be achieved in: 8 weeks  1. Disability index score of 5% or less for the KELSEY to assist with reaching prior level of function. [] Progressing: [] Met: [x] Not Met: [] Adjusted  2.  Patient will demonstrate increased AROM to WNL, good LS mobility, good hip ROM to allow for proper joint functioning as indicated by patients Functional Deficits. [] Progressing: [] Met: [x] Not Met: [] Adjusted  3. Patient will demonstrate an increase in Strength to good proximal hip and core activation to allow for proper functional mobility as indicated by patients Functional Deficits. [] Progressing: [] Met: [x] Not Met: [] Adjusted  4. Patient will return to all functional activities without increased symptoms or restriction. [] Progressing: [] Met: [x] Not Met: [] Adjusted  5. Pt will perform sit to stand transfer with no pain. [] Progressing: [] Met: [x] Not Met: [] Adjusted     Electronically signed by:   Sheryle Mark, PT

## 2021-06-01 NOTE — FLOWSHEET NOTE
cueing for activities related to improving balance, coordination, kinesthetic sense, posture, motor skill, proprioception  to assist with core control in self care, mobility, lifting, and ambulation. Therapeutic Activities:    [] (65758 or 47330) Provided verbal/tactile cueing for activities related to improving balance, coordination, kinesthetic sense, posture, motor skill, proprioception and motor activation to allow for proper function  with self care and ADLs  [] (98728) Provided training and instruction to the patient for proper core and proximal hip recruitment and positioning with ambulation re-education     Home Exercise Program:    [x] (87683) Reviewed/Progressed HEP activities related to strengthening, flexibility, endurance, ROM of core, proximal hip and LE for functional self-care, mobility, lifting and ambulation   [] (91913) Reviewed/Progressed HEP activities related to improving balance, coordination, kinesthetic sense, posture, motor skill, proprioception of core, proximal hip and LE for self care, mobility, lifting, and ambulation      Manual Treatments:  PROM / STM / Oscillations-Mobs:  G-I, II, III, IV (PA's, Inf., Post.)  [x] (26335) Provided manual therapy to mobilize proximal hip and LS spine soft tissue/joints for the purpose of modulating pain, promoting relaxation,  increasing ROM, reducing/eliminating soft tissue swelling/inflammation/restriction, improving soft tissue extensibility and allowing for proper ROM for normal function with self care, mobility, lifting and ambulation.      Modalities:       Charges:  Timed Code Treatment Minutes: eval + 25 min   Total Treatment Minutes: 40 min       [x] EVAL (LOW) 90723 (typically 20 minutes face-to-face)  [] EVAL (MOD) 77346 (typically 30 minutes face-to-face)  [] EVAL (HIGH) 68622 (typically 45 minutes face-to-face)  [] RE-EVAL     [x] XG(94677) x  1   [] IONTO (80657)  [] NMR (65481) x     [] VASO (99553)  [x] Manual (70417) x 1     [] Other:  [] TA (89552)x     [] [facility] Traction (40693)  [] ES(attended) (53528)     [] ES (un) (38245):        Goals:   Patient stated goal: \"no back pain, get back into the gym\"   []? Progressing: []? Met: [x]? Not Met: []? Adjusted     Therapist goals for Patient:   Short Term Goals: To be achieved in: 2 weeks  1. Independent in HEP and progression per patient tolerance, in order to prevent re-injury. []? Progressing: []? Met: [x]? Not Met: []? Adjusted  2. Patient will have a decrease in pain to facilitate improvement in movement, function, and ADLs as indicated by Functional Deficits. []? Progressing: []? Met: [x]? Not Met: []? Adjusted     Long Term Goals: To be achieved in: 8 weeks  1. Disability index score of 5% or less for the KELSEY to assist with reaching prior level of function. []? Progressing: []? Met: [x]? Not Met: []? Adjusted  2. Patient will demonstrate increased AROM to WNL, good LS mobility, good hip ROM to allow for proper joint functioning as indicated by patients Functional Deficits. []? Progressing: []? Met: [x]? Not Met: []? Adjusted  3. Patient will demonstrate an increase in Strength to good proximal hip and core activation to allow for proper functional mobility as indicated by patients Functional Deficits. []? Progressing: []? Met: [x]? Not Met: []? Adjusted  4. Patient will return to all functional activities without increased symptoms or restriction. []? Progressing: []? Met: [x]? Not Met: []? Adjusted  5. Pt will perform sit to stand transfer with no pain. []? Progressing: []? Met: [x]? Not Met: []? Adjusted        Progression Towards Functional goals:  [] Patient is progressing as expected towards functional goals listed. [] Progression is slowed due to complexities listed. [] Progression has been slowed due to co-morbidities.   [x] Plan just implemented, too soon to assess goals progression  [] Other:     ASSESSMENT:  See eval    Treatment/Activity Tolerance:  [x] Patient tolerated treatment well [] Patient limited by fatique  [] Patient limited by pain  [] Patient limited by other medical complications  [] Other:     Overall Progression Towards Functional goals/ Treatment Progress Update:  [] Patient is progressing as expected towards functional goals listed. [] Progression is slowed due to complexities/Impairments listed. [] Progression has been slowed due to co-morbidities. [x] Plan just implemented, too soon to assess goals progression <30days   [] Goals require adjustment due to lack of progress  [] Patient is not progressing as expected and requires additional follow up with physician  [] Other:    Prognosis for POC: [x] Good [] Fair  [] Poor    Patient requires continued skilled intervention: [x] Yes  [] No        PLAN: See eval  [] Continue per plan of care [] Alter current plan (see comments)  [x] Plan of care initiated [] Hold pending MD visit [] Discharge    Electronically signed by: Grey Landrum, PT    Note: If patient does not return for scheduled/recommended follow up visits, this note will serve as a discharge from care along with the most recent update on progress.

## 2021-06-08 ENCOUNTER — HOSPITAL ENCOUNTER (OUTPATIENT)
Dept: PHYSICAL THERAPY | Age: 56
Setting detail: THERAPIES SERIES
Discharge: HOME | End: 2021-06-08
Payer: COMMERCIAL

## 2021-06-08 PROCEDURE — 97112 NEUROMUSCULAR REEDUCATION: CPT

## 2021-06-08 PROCEDURE — 97110 THERAPEUTIC EXERCISES: CPT

## 2021-06-08 PROCEDURE — 97140 MANUAL THERAPY 1/> REGIONS: CPT

## 2021-06-08 NOTE — FLOWSHEET NOTE
[facility]    Physical Therapy Treatment Note/ Progress Report:     Date:  2021    Patient Name:  Monae Wellington    :  1965  MRN: [id number]  Medical/Treatment Diagnosis Information:  Diagnosis: M51.26 (Lumbar discogenic pain)  Treatment Diagnosis: low back pain  Insurance/Certification information:  PT Insurance Information: [employer]/5000 deductible/60 visits  Physician Information:  Referring Practitioner: Dr. Richmond Garcia of care signed (Y/N):     Date of Patient follow up with Physician:      Progress Report: []  Yes  [x]  No     Functional Scale: KELSEY 5    Date: 21    Date Range for reporting period:  Beginnin21  Ending:     Progress report due (10 Rx/or 30 days whichever is less): 24    Recertification due (POC duration/ or 90 days whichever is less):  21    Visit # Insurance Allowable Auth Needed   2 60 []Yes    [x]No     Pain level:  5/10 at its worst     SUBJECTIVE: Pt reports improved symptoms with minimal intermittent pain.      OBJECTIVE: See eval   Observation:    Test measurements:      RESTRICTIONS/PRECAUTIONS: directional preference ext, stabilization      Exercises/Interventions:     Therapeutic Ex Wt / Resistance sets/sec reps notes   BIke NPV      CARYN  x15     Supine sciatic nerve glide  x10  B   BKFO blue x30     Bridges NPV      Standing banded marches  x10  B with cueing for glute activation   Standing glute med activation  10 10s B                                             Therapeutic Activities                                                               Manual Intervention               Prone PA  8 min     GISTM/STM       Lumbar Manip       SI mobs  3 min     Hip belt mobs       Hip LA distraction              NMR re-education        TA activation   + table top  +banded marches     blue 10 10s                                  Therapeutic Exercise and NMR EXR  [x] (44752) Provided verbal/tactile cueing for activities related to strengthening, flexibility, endurance, ROM  for improvements in proximal hip and core control with self care, mobility, lifting and ambulation.  [] (32267) Provided verbal/tactile cueing for activities related to improving balance, coordination, kinesthetic sense, posture, motor skill, proprioception  to assist with core control in self care, mobility, lifting, and ambulation. Therapeutic Activities:    [] (32261 or 03080) Provided verbal/tactile cueing for activities related to improving balance, coordination, kinesthetic sense, posture, motor skill, proprioception and motor activation to allow for proper function  with self care and ADLs  [] (92770) Provided training and instruction to the patient for proper core and proximal hip recruitment and positioning with ambulation re-education     Home Exercise Program:    [x] (06706) Reviewed/Progressed HEP activities related to strengthening, flexibility, endurance, ROM of core, proximal hip and LE for functional self-care, mobility, lifting and ambulation   [] (83032) Reviewed/Progressed HEP activities related to improving balance, coordination, kinesthetic sense, posture, motor skill, proprioception of core, proximal hip and LE for self care, mobility, lifting, and ambulation      Manual Treatments:  PROM / STM / Oscillations-Mobs:  G-I, II, III, IV (PA's, Inf., Post.)  [x] (05254) Provided manual therapy to mobilize proximal hip and LS spine soft tissue/joints for the purpose of modulating pain, promoting relaxation,  increasing ROM, reducing/eliminating soft tissue swelling/inflammation/restriction, improving soft tissue extensibility and allowing for proper ROM for normal function with self care, mobility, lifting and ambulation.      Modalities:       Charges:  Timed Code Treatment Minutes: 45 min   Total Treatment Minutes: 45 min       [x] EVAL (LOW) 69881 (typically 20 minutes face-to-face)  [] EVAL (MOD) 35484 (typically 30 minutes face-to-face)  [] EVAL (HIGH) 70170 (typically 45 minutes face-to-face)  [] RE-EVAL     [x] MJ(03527) x  1   [] IONTO (18630)  [] NMR (25229) x     [] VASO (49593)  [x] Manual (18257) x 1     [] Other:  [] TA (84972)x     [] Mech Traction (67059)  [] ES(attended) (74699)     [] ES (un) (31896):        Goals:   Patient stated goal: \"no back pain, get back into the gym\"   []? Progressing: []? Met: [x]? Not Met: []? Adjusted     Therapist goals for Patient:   Short Term Goals: To be achieved in: 2 weeks  1. Independent in HEP and progression per patient tolerance, in order to prevent re-injury. []? Progressing: []? Met: [x]? Not Met: []? Adjusted  2. Patient will have a decrease in pain to facilitate improvement in movement, function, and ADLs as indicated by Functional Deficits. []? Progressing: []? Met: [x]? Not Met: []? Adjusted     Long Term Goals: To be achieved in: 8 weeks  1. Disability index score of 5% or less for the KELSEY to assist with reaching prior level of function. []? Progressing: []? Met: [x]? Not Met: []? Adjusted  2. Patient will demonstrate increased AROM to WNL, good LS mobility, good hip ROM to allow for proper joint functioning as indicated by patients Functional Deficits. []? Progressing: []? Met: [x]? Not Met: []? Adjusted  3. Patient will demonstrate an increase in Strength to good proximal hip and core activation to allow for proper functional mobility as indicated by patients Functional Deficits. []? Progressing: []? Met: [x]? Not Met: []? Adjusted  4. Patient will return to all functional activities without increased symptoms or restriction. []? Progressing: []? Met: [x]? Not Met: []? Adjusted  5. Pt will perform sit to stand transfer with no pain. []? Progressing: []? Met: [x]? Not Met: []? Adjusted        Progression Towards Functional goals:  [] Patient is progressing as expected towards functional goals listed.     [] Progression is slowed due to complexities listed. [] Progression has been slowed due to co-morbidities. [x] Plan just implemented, too soon to assess goals progression  [] Other:     ASSESSMENT:  Focus on stabilization this treatment session with cueing for glute and TA activation with functional activities. Treatment/Activity Tolerance:  [x] Patient tolerated treatment well [] Patient limited by fatique  [] Patient limited by pain  [] Patient limited by other medical complications  [] Other:     Overall Progression Towards Functional goals/ Treatment Progress Update:  [] Patient is progressing as expected towards functional goals listed. [] Progression is slowed due to complexities/Impairments listed. [] Progression has been slowed due to co-morbidities. [x] Plan just implemented, too soon to assess goals progression <30days   [] Goals require adjustment due to lack of progress  [] Patient is not progressing as expected and requires additional follow up with physician  [] Other:    Prognosis for POC: [x] Good [] Fair  [] Poor    Patient requires continued skilled intervention: [x] Yes  [] No        PLAN: Progress stabilization as tolerated. [x] Continue per plan of care [] Alter current plan (see comments)  [] Plan of care initiated [] Hold pending MD visit [] Discharge    Electronically signed by: Grey Landrum, PT    Note: If patient does not return for scheduled/recommended follow up visits, this note will serve as a discharge from care along with the most recent update on progress.

## 2021-06-15 ENCOUNTER — HOSPITAL ENCOUNTER (OUTPATIENT)
Dept: PHYSICAL THERAPY | Age: 56
Setting detail: THERAPIES SERIES
Discharge: HOME | End: 2021-06-15
Payer: COMMERCIAL

## 2021-06-15 PROCEDURE — 97112 NEUROMUSCULAR REEDUCATION: CPT

## 2021-06-15 PROCEDURE — 97140 MANUAL THERAPY 1/> REGIONS: CPT

## 2021-06-15 PROCEDURE — 97110 THERAPEUTIC EXERCISES: CPT

## 2021-06-15 NOTE — FLOWSHEET NOTE
[facility]    Physical Therapy Treatment Note/ Progress Report:     Date:  6/15/2021    Patient Name:  Lani Cole    :  1965  MRN: [id number]  Medical/Treatment Diagnosis Information:  Diagnosis: M51.26 (Lumbar discogenic pain)  Treatment Diagnosis: low back pain  Insurance/Certification information:  PT Insurance Information: [employer]/5000 deductible/60 visits  Physician Information:  Referring Practitioner: Dr. Aisha Chaudhary of care signed (Y/N):     Date of Patient follow up with Physician:      Progress Report: []  Yes  [x]  No     Functional Scale: KELSEY 5    Date: 21    Date Range for reporting period:  Beginnin21  Ending:     Progress report due (10 Rx/or 30 days whichever is less): 16    Recertification due (POC duration/ or 90 days whichever is less):  21    Visit # Insurance Allowable Auth Needed   3 60 []Yes    [x]No     Pain level:  0/10    SUBJECTIVE: Pt reports having no pain since last visit. Pt states that his back feels tight today, which he attributes to being on his feet more at work yesterday.       OBJECTIVE: See eval   Observation:    Test measurements:      RESTRICTIONS/PRECAUTIONS: directional preference ext, stabilization      Exercises/Interventions:     Therapeutic Ex Wt / Resistance sets/sec reps notes   BIke 5'      CARYN  x15     Supine sciatic nerve glide  x10  B   BKFO blue x30     Bridges  x15     Standing banded marches  x10  B with cueing for glute activation   Standing glute med activation  10 10s B                                             Therapeutic Activities                                                               Manual Intervention               Prone PA  8 min     GISTM/STM       Lumbar Manip       SI mobs  3 min     Hip belt mobs       Hip LA distraction              NMR re-education        TA activation   + table top  +banded marches     blue 10 10s Therapeutic Exercise and NMR EXR  [x] (66946) Provided verbal/tactile cueing for activities related to strengthening, flexibility, endurance, ROM  for improvements in proximal hip and core control with self care, mobility, lifting and ambulation.  [] (00905) Provided verbal/tactile cueing for activities related to improving balance, coordination, kinesthetic sense, posture, motor skill, proprioception  to assist with core control in self care, mobility, lifting, and ambulation. Therapeutic Activities:    [] (40159 or 60863) Provided verbal/tactile cueing for activities related to improving balance, coordination, kinesthetic sense, posture, motor skill, proprioception and motor activation to allow for proper function  with self care and ADLs  [] (11874) Provided training and instruction to the patient for proper core and proximal hip recruitment and positioning with ambulation re-education     Home Exercise Program:    [x] (37069) Reviewed/Progressed HEP activities related to strengthening, flexibility, endurance, ROM of core, proximal hip and LE for functional self-care, mobility, lifting and ambulation   [] (92339) Reviewed/Progressed HEP activities related to improving balance, coordination, kinesthetic sense, posture, motor skill, proprioception of core, proximal hip and LE for self care, mobility, lifting, and ambulation      Manual Treatments:  PROM / STM / Oscillations-Mobs:  G-I, II, III, IV (PA's, Inf., Post.)  [x] (63268) Provided manual therapy to mobilize proximal hip and LS spine soft tissue/joints for the purpose of modulating pain, promoting relaxation,  increasing ROM, reducing/eliminating soft tissue swelling/inflammation/restriction, improving soft tissue extensibility and allowing for proper ROM for normal function with self care, mobility, lifting and ambulation.      Modalities:       Charges:  Timed Code Treatment Minutes: 45 min   Total Treatment Minutes: 45 min       [] MARIIA (LOW) 45737 (typically 20 minutes face-to-face)  [] EVAL (MOD) 20301 (typically 30 minutes face-to-face)  [] EVAL (HIGH) 12566 (typically 45 minutes face-to-face)  [] RE-EVAL     [x] XP(27125) x  1   [] IONTO (84779)  [x] NMR (31363) x  1   [] VASO (40389)  [x] Manual (37643) x 1     [] Other:  [] TA (37167)x     [] Mech Traction (80817)  [] ES(attended) (58564)     [] ES (un) (63055):        Goals:   Patient stated goal: \"no back pain, get back into the gym\"   []? Progressing: []? Met: [x]? Not Met: []? Adjusted     Therapist goals for Patient:   Short Term Goals: To be achieved in: 2 weeks  1. Independent in HEP and progression per patient tolerance, in order to prevent re-injury. []? Progressing: []? Met: [x]? Not Met: []? Adjusted  2. Patient will have a decrease in pain to facilitate improvement in movement, function, and ADLs as indicated by Functional Deficits. []? Progressing: []? Met: [x]? Not Met: []? Adjusted     Long Term Goals: To be achieved in: 8 weeks  1. Disability index score of 5% or less for the KELSEY to assist with reaching prior level of function. []? Progressing: []? Met: [x]? Not Met: []? Adjusted  2. Patient will demonstrate increased AROM to WNL, good LS mobility, good hip ROM to allow for proper joint functioning as indicated by patients Functional Deficits. []? Progressing: []? Met: [x]? Not Met: []? Adjusted  3. Patient will demonstrate an increase in Strength to good proximal hip and core activation to allow for proper functional mobility as indicated by patients Functional Deficits. []? Progressing: []? Met: [x]? Not Met: []? Adjusted  4. Patient will return to all functional activities without increased symptoms or restriction. []? Progressing: []? Met: [x]? Not Met: []? Adjusted  5. Pt will perform sit to stand transfer with no pain. []? Progressing: []? Met: [x]? Not Met: []?  Adjusted        Progression Towards Functional goals:  [] Patient is progressing as expected towards functional goals listed. [] Progression is slowed due to complexities listed. [] Progression has been slowed due to co-morbidities. [x] Plan just implemented, too soon to assess goals progression  [] Other:     ASSESSMENT:  Stiffness noted with lumbar and lower thoracic PA mobs and good response to SI mobs. Pt reports of decreased stiffness after manual therapy. Added bike and bridges with no increased LBP noted. Pt requires occasional cues for appropriate glut activation with exercises today. Treatment/Activity Tolerance:  [x] Patient tolerated treatment well [] Patient limited by fatique  [] Patient limited by pain  [] Patient limited by other medical complications  [] Other:     Overall Progression Towards Functional goals/ Treatment Progress Update:  [] Patient is progressing as expected towards functional goals listed. [] Progression is slowed due to complexities/Impairments listed. [] Progression has been slowed due to co-morbidities. [x] Plan just implemented, too soon to assess goals progression <30days   [] Goals require adjustment due to lack of progress  [] Patient is not progressing as expected and requires additional follow up with physician  [] Other:    Prognosis for POC: [x] Good [] Fair  [] Poor    Patient requires continued skilled intervention: [x] Yes  [] No        PLAN: Progress stabilization as tolerated. [x] Continue per plan of care [] Alter current plan (see comments)  [] Plan of care initiated [] Hold pending MD visit [] Discharge    Electronically signed by: Jazmine Wilsl PT    Note: If patient does not return for scheduled/recommended follow up visits, this note will serve as a discharge from care along with the most recent update on progress.

## 2021-06-22 ENCOUNTER — HOSPITAL ENCOUNTER (OUTPATIENT)
Dept: PHYSICAL THERAPY | Age: 56
Setting detail: THERAPIES SERIES
Discharge: HOME | End: 2021-06-22
Payer: COMMERCIAL

## 2021-06-22 ENCOUNTER — OFFICE VISIT (OUTPATIENT)
Dept: ORTHOPEDIC SURGERY | Age: 56
End: 2021-06-22
Payer: COMMERCIAL

## 2021-06-22 VITALS — HEIGHT: 74 IN | BODY MASS INDEX: 40.43 KG/M2 | WEIGHT: 315 LBS

## 2021-06-22 DIAGNOSIS — M51.27 HERNIATION OF INTERVERTEBRAL DISC BETWEEN L5 AND S1: Primary | ICD-10-CM

## 2021-06-22 DIAGNOSIS — M51.36 DDD (DEGENERATIVE DISC DISEASE), LUMBAR: ICD-10-CM

## 2021-06-22 DIAGNOSIS — M47.816 LUMBAR SPONDYLOSIS: ICD-10-CM

## 2021-06-22 PROCEDURE — 99213 OFFICE O/P EST LOW 20 MIN: CPT | Performed by: INTERNAL MEDICINE

## 2021-06-22 PROCEDURE — 97110 THERAPEUTIC EXERCISES: CPT

## 2021-06-22 PROCEDURE — 97112 NEUROMUSCULAR REEDUCATION: CPT

## 2021-06-22 RX ORDER — MELOXICAM 15 MG/1
15 TABLET ORAL DAILY
Qty: 30 TABLET | Refills: 3
Start: 2021-06-22 | End: 2021-08-30

## 2021-06-22 NOTE — PROGRESS NOTES
Chief Complaint:   Chief Complaint   Patient presents with    Lower Back Pain     pain-free past 2 weeks, PT very helpful, still tight but stretching          History of Present Illness:       Patient is a 54 y.o. male returns follow up for the above complaint. The patient was last seen approximately 1 monthsago. The symptoms are improving since the last visit. The patient has had no further testing for the problem. He has noted therapeutic benefit from formal course of PT    He is now back pain free and level function has improved    He is pleased with the improvement. No reliance on NSAIDs or other analgesics    He denies any lower limb symptoms back pain to leg pain ratio 0:100     Past Medical History:        Past Medical History:   Diagnosis Date    Adenomatous polyp of ascending colon     Next colonoscopy 2022. Sessile serrated adenoma, tubular adenoma Dr. Sharad Phillips Adenomatous polyp of transverse colon     Colonoscopy 2020. Multiple polypectomy    Anxiety     Atrial fibrillation (HCC)     Basal cell carcinoma of left temple region 12/20/2017    Colon polyp 12/09/2019    Poor Prep.  Recommend repeat colonoscopy in 1 year with 2 days prep-Dr. Gaby Hatfield Fatty liver 12/05/2019    GERD (gastroesophageal reflux disease)     Hypertension     IBS (irritable bowel syndrome) 2017    Mixed hyperlipidemia 04/25/2017    Obstructive sleep apnea (adult) (pediatric)         Present Medications:         Current Outpatient Medications   Medication Sig Dispense Refill    meloxicam (MOBIC) 15 MG tablet Take 1 tablet by mouth daily 30 tablet 3    methocarbamol (ROBAXIN) 750 MG tablet TAKE 1 TABLET BY MOUTH THREE TIMES DAILY 90 tablet 1    citalopram (CELEXA) 40 MG tablet Take 1 tablet by mouth once daily 90 tablet 1    lansoprazole (PREVACID) 30 MG delayed release capsule Take 1 capsule by mouth once daily 90 capsule 0    sildenafil (VIAGRA) 50 MG tablet Take 1 tablet by mouth daily as needed for

## 2021-06-22 NOTE — FLOWSHEET NOTE
[employer]    Physical Therapy Treatment Note/ Progress Report:     Date:  2021    Patient Name:  Laquita Pang    :  1965  MRN: [id number]  Medical/Treatment Diagnosis Information:  Diagnosis: M51.26 (Lumbar discogenic pain)  Treatment Diagnosis: low back pain  Insurance/Certification information:  PT Insurance Information: [employer]/5000 deductible/60 visits  Physician Information:  Referring Practitioner: Dr. Ciera Malave of care signed (Y/N):     Date of Patient follow up with Physician:      Progress Report: []  Yes  [x]  No     Functional Scale: KELSEY 5    Date: 21    Date Range for reporting period:  Beginnin21  Ending:     Progress report due (10 Rx/or 30 days whichever is less): 66    Recertification due (POC duration/ or 90 days whichever is less):  21    Visit # Insurance Allowable Auth Needed   4 60 []Yes    [x]No     Pain level:  0/10    SUBJECTIVE: Pt reports back is feeling great, no pain.        OBJECTIVE: See eval   Observation:    Test measurements:      RESTRICTIONS/PRECAUTIONS: directional preference ext, stabilization      Exercises/Interventions:     Therapeutic Ex Wt / Resistance sets/sec reps notes   BIke 5'      CARYN  x15     Supine sciatic nerve glide  x10  B   BKFO  Clam shells  Blue  teal x30     Bridges Teal x15     Standing banded marches  x10  B with cueing for glute activation   Standing glute med activation  10 10s B           Lateral steps [place] 3 laps                                Therapeutic Activities                                                               Manual Intervention               Prone PA  8 min     GISTM/STM       Lumbar Manip       SI mobs  3 min     Hip belt mobs       Hip LA distraction              NMR re-education        TA activation   + table top  +banded marches     teal 10 10s                                  Therapeutic Exercise and NMR EXR  [x] (41671) Provided verbal/tactile cueing for activities related to strengthening, flexibility, endurance, ROM  for improvements in proximal hip and core control with self care, mobility, lifting and ambulation.  [] (43118) Provided verbal/tactile cueing for activities related to improving balance, coordination, kinesthetic sense, posture, motor skill, proprioception  to assist with core control in self care, mobility, lifting, and ambulation. Therapeutic Activities:    [] (07606 or 67247) Provided verbal/tactile cueing for activities related to improving balance, coordination, kinesthetic sense, posture, motor skill, proprioception and motor activation to allow for proper function  with self care and ADLs  [] (70346) Provided training and instruction to the patient for proper core and proximal hip recruitment and positioning with ambulation re-education     Home Exercise Program:    [x] (97423) Reviewed/Progressed HEP activities related to strengthening, flexibility, endurance, ROM of core, proximal hip and LE for functional self-care, mobility, lifting and ambulation   [] (04665) Reviewed/Progressed HEP activities related to improving balance, coordination, kinesthetic sense, posture, motor skill, proprioception of core, proximal hip and LE for self care, mobility, lifting, and ambulation      Manual Treatments:  PROM / STM / Oscillations-Mobs:  G-I, II, III, IV (PA's, Inf., Post.)  [x] (65085) Provided manual therapy to mobilize proximal hip and LS spine soft tissue/joints for the purpose of modulating pain, promoting relaxation,  increasing ROM, reducing/eliminating soft tissue swelling/inflammation/restriction, improving soft tissue extensibility and allowing for proper ROM for normal function with self care, mobility, lifting and ambulation.      Modalities:       Charges:  Timed Code Treatment Minutes: 45 min   Total Treatment Minutes: 45 min       [] DENISEAL (LOW) 76067 (typically 20 minutes face-to-face)  [] EVAL (MOD) 74711 (typically 30 minutes face-to-face)  [] EVAL (HIGH) 34864 (typically 45 minutes face-to-face)  [] RE-EVAL     [x] IF(51193) x  1   [] IONTO (52580)  [x] NMR (29057) x  1   [] VASO (03366)  [x] Manual (56350) x 1     [] Other:  [] TA (15041)x     [] Mech Traction (44692)  [] ES(attended) (49953)     [] ES (un) (58230):        Goals:   Patient stated goal: \"no back pain, get back into the gym\"   []? Progressing: []? Met: [x]? Not Met: []? Adjusted     Therapist goals for Patient:   Short Term Goals: To be achieved in: 2 weeks  1. Independent in HEP and progression per patient tolerance, in order to prevent re-injury. []? Progressing: []? Met: [x]? Not Met: []? Adjusted  2. Patient will have a decrease in pain to facilitate improvement in movement, function, and ADLs as indicated by Functional Deficits. []? Progressing: []? Met: [x]? Not Met: []? Adjusted     Long Term Goals: To be achieved in: 8 weeks  1. Disability index score of 5% or less for the KELSEY to assist with reaching prior level of function. []? Progressing: []? Met: [x]? Not Met: []? Adjusted  2. Patient will demonstrate increased AROM to WNL, good LS mobility, good hip ROM to allow for proper joint functioning as indicated by patients Functional Deficits. []? Progressing: []? Met: [x]? Not Met: []? Adjusted  3. Patient will demonstrate an increase in Strength to good proximal hip and core activation to allow for proper functional mobility as indicated by patients Functional Deficits. []? Progressing: []? Met: [x]? Not Met: []? Adjusted  4. Patient will return to all functional activities without increased symptoms or restriction. []? Progressing: []? Met: [x]? Not Met: []? Adjusted  5. Pt will perform sit to stand transfer with no pain. []? Progressing: []? Met: [x]? Not Met: []? Adjusted        Progression Towards Functional goals:  [] Patient is progressing as expected towards functional goals listed.     [] Progression is

## 2021-06-25 ENCOUNTER — OFFICE VISIT (OUTPATIENT)
Dept: CARDIOLOGY CLINIC | Age: 56
End: 2021-06-25
Payer: COMMERCIAL

## 2021-06-25 VITALS
WEIGHT: 315 LBS | DIASTOLIC BLOOD PRESSURE: 99 MMHG | BODY MASS INDEX: 40.43 KG/M2 | HEIGHT: 74 IN | SYSTOLIC BLOOD PRESSURE: 162 MMHG

## 2021-06-25 DIAGNOSIS — E78.2 MIXED HYPERLIPIDEMIA: ICD-10-CM

## 2021-06-25 DIAGNOSIS — I48.0 PAROXYSMAL ATRIAL FIBRILLATION (HCC): Chronic | ICD-10-CM

## 2021-06-25 DIAGNOSIS — I10 ESSENTIAL HYPERTENSION: ICD-10-CM

## 2021-06-25 DIAGNOSIS — R10.13 EPIGASTRIC PAIN: ICD-10-CM

## 2021-06-25 DIAGNOSIS — E66.09 CLASS 2 OBESITY DUE TO EXCESS CALORIES WITH BODY MASS INDEX (BMI) OF 36.0 TO 36.9 IN ADULT, UNSPECIFIED WHETHER SERIOUS COMORBIDITY PRESENT: ICD-10-CM

## 2021-06-25 DIAGNOSIS — G47.33 OBSTRUCTIVE SLEEP APNEA SYNDROME: ICD-10-CM

## 2021-06-25 DIAGNOSIS — I49.1 PAC (PREMATURE ATRIAL CONTRACTION): Primary | ICD-10-CM

## 2021-06-25 PROCEDURE — 99214 OFFICE O/P EST MOD 30 MIN: CPT | Performed by: NURSE PRACTITIONER

## 2021-06-25 PROCEDURE — 93000 ELECTROCARDIOGRAM COMPLETE: CPT | Performed by: NURSE PRACTITIONER

## 2021-06-25 RX ORDER — DILTIAZEM HYDROCHLORIDE 360 MG/1
CAPSULE, EXTENDED RELEASE ORAL
Qty: 90 CAPSULE | Refills: 1 | Status: SHIPPED | OUTPATIENT
Start: 2021-06-25

## 2021-06-25 RX ORDER — LANSOPRAZOLE 30 MG/1
CAPSULE, DELAYED RELEASE ORAL
Qty: 90 CAPSULE | Refills: 0 | Status: SHIPPED | OUTPATIENT
Start: 2021-06-25 | End: 2021-09-23 | Stop reason: SDUPTHER

## 2021-06-25 NOTE — PATIENT INSTRUCTIONS
1. Stop spironolactone  2. Increase cardizem to 360 mg daily  3. Continue to monitor blood pressure at home  4. Work on exercise and weight loss.  Call if you want referral to weight management

## 2021-07-06 RX ORDER — ATORVASTATIN CALCIUM 40 MG/1
TABLET, FILM COATED ORAL
Qty: 90 TABLET | Refills: 0 | Status: SHIPPED | OUTPATIENT
Start: 2021-07-06 | End: 2021-09-30 | Stop reason: SDUPTHER

## 2021-07-06 RX ORDER — LOSARTAN POTASSIUM 100 MG/1
TABLET ORAL
Qty: 90 TABLET | Refills: 0 | Status: SHIPPED | OUTPATIENT
Start: 2021-07-06 | End: 2021-09-30 | Stop reason: SDUPTHER

## 2021-07-30 ENCOUNTER — OFFICE VISIT (OUTPATIENT)
Dept: INTERNAL MEDICINE CLINIC | Age: 56
End: 2021-07-30
Payer: COMMERCIAL

## 2021-07-30 VITALS
HEIGHT: 74 IN | HEART RATE: 72 BPM | SYSTOLIC BLOOD PRESSURE: 136 MMHG | BODY MASS INDEX: 40.43 KG/M2 | WEIGHT: 315 LBS | DIASTOLIC BLOOD PRESSURE: 82 MMHG

## 2021-07-30 DIAGNOSIS — F41.1 GENERALIZED ANXIETY DISORDER: ICD-10-CM

## 2021-07-30 DIAGNOSIS — I10 ESSENTIAL HYPERTENSION: Primary | ICD-10-CM

## 2021-07-30 DIAGNOSIS — E78.2 MIXED HYPERLIPIDEMIA: ICD-10-CM

## 2021-07-30 DIAGNOSIS — Z23 NEED FOR PROPHYLACTIC VACCINATION AGAINST STREPTOCOCCUS PNEUMONIAE (PNEUMOCOCCUS): ICD-10-CM

## 2021-07-30 DIAGNOSIS — K58.9 IRRITABLE BOWEL SYNDROME, UNSPECIFIED TYPE: ICD-10-CM

## 2021-07-30 PROCEDURE — 90732 PPSV23 VACC 2 YRS+ SUBQ/IM: CPT | Performed by: INTERNAL MEDICINE

## 2021-07-30 PROCEDURE — 99214 OFFICE O/P EST MOD 30 MIN: CPT | Performed by: INTERNAL MEDICINE

## 2021-07-30 PROCEDURE — 90471 IMMUNIZATION ADMIN: CPT | Performed by: INTERNAL MEDICINE

## 2021-07-30 SDOH — ECONOMIC STABILITY: FOOD INSECURITY: WITHIN THE PAST 12 MONTHS, THE FOOD YOU BOUGHT JUST DIDN'T LAST AND YOU DIDN'T HAVE MONEY TO GET MORE.: NEVER TRUE

## 2021-07-30 SDOH — ECONOMIC STABILITY: FOOD INSECURITY: WITHIN THE PAST 12 MONTHS, YOU WORRIED THAT YOUR FOOD WOULD RUN OUT BEFORE YOU GOT MONEY TO BUY MORE.: NEVER TRUE

## 2021-07-30 ASSESSMENT — ENCOUNTER SYMPTOMS
NAUSEA: 0
TROUBLE SWALLOWING: 0
ABDOMINAL PAIN: 0
VOICE CHANGE: 0
SHORTNESS OF BREATH: 0

## 2021-07-30 ASSESSMENT — SOCIAL DETERMINANTS OF HEALTH (SDOH): HOW HARD IS IT FOR YOU TO PAY FOR THE VERY BASICS LIKE FOOD, HOUSING, MEDICAL CARE, AND HEATING?: NOT HARD AT ALL

## 2021-07-30 NOTE — PROGRESS NOTES
Substance and Sexual Activity    Alcohol use: No     Comment: occas (last drink March 2016)    Drug use: No    Sexual activity: None   Other Topics Concern    None   Social History Narrative    None     Social Determinants of Health     Financial Resource Strain: Low Risk     Difficulty of Paying Living Expenses: Not hard at all   Food Insecurity: No Food Insecurity    Worried About Running Out of Food in the Last Year: Never true    Kelsey of Food in the Last Year: Never true   Transportation Needs:     Lack of Transportation (Medical):  Lack of Transportation (Non-Medical):    Physical Activity:     Days of Exercise per Week:     Minutes of Exercise per Session:    Stress:     Feeling of Stress :    Social Connections:     Frequency of Communication with Friends and Family:     Frequency of Social Gatherings with Friends and Family:     Attends Tenriism Services:     Active Member of Clubs or Organizations:     Attends Club or Organization Meetings:     Marital Status:    Intimate Partner Violence:     Fear of Current or Ex-Partner:     Emotionally Abused:     Physically Abused:     Sexually Abused:      Family History   Problem Relation Age of Onset    High Blood Pressure Mother     Stroke Mother     Other Mother         MICKIE    Heart Disease Neg Hx     High Cholesterol Neg Hx        Review of Systems   Constitutional: Negative for appetite change and unexpected weight change. HENT: Negative for trouble swallowing and voice change. Respiratory: Negative for shortness of breath. Cardiovascular: Positive for leg swelling. Negative for chest pain and palpitations. He noticed slight increased leg swelling since discontinuing Aldactone and increasing Cardizem   Gastrointestinal: Negative for abdominal pain and nausea. Neurological: Negative for dizziness and light-headedness.        OBJECTIVE:  Pulse Readings from Last 4 Encounters:   07/30/21 72   04/16/21 84 01/15/21 84   12/18/20 84     Wt Readings from Last 4 Encounters:   07/30/21 (!) 344 lb 6.4 oz (156.2 kg)   06/25/21 (!) 343 lb 6.4 oz (155.8 kg)   06/22/21 (!) 343 lb 0.6 oz (155.6 kg)   05/25/21 (!) 343 lb 0.6 oz (155.6 kg)     BP Readings from Last 4 Encounters:   07/30/21 136/82   06/25/21 (!) 162/99   04/16/21 (!) 142/92   01/15/21 138/88     Physical Exam  Vitals and nursing note reviewed. Constitutional:       Appearance: He is obese. He is not ill-appearing. Eyes:      General: No scleral icterus. Conjunctiva/sclera: Conjunctivae normal.   Cardiovascular:      Rate and Rhythm: Normal rate and regular rhythm. Pulses: Normal pulses. Heart sounds: Normal heart sounds. Pulmonary:      Effort: Pulmonary effort is normal.      Breath sounds: Normal breath sounds. Abdominal:      General: Bowel sounds are normal.   Musculoskeletal:      Comments: Bilateral 1+ pedal and pretibial edema. No calf tenderness   Neurological:      Mental Status: He is alert.          CBC:   Lab Results   Component Value Date    WBC 5.9 11/22/2019    HGB 14.3 11/22/2019    HCT 41.1 11/22/2019     11/22/2019     CMP:  Lab Results   Component Value Date     05/07/2021    K 4.1 05/07/2021     05/07/2021    CO2 25 05/07/2021    ANIONGAP 13 05/07/2021    GLUCOSE 129 05/07/2021    BUN 14 05/07/2021    CREATININE 1.0 05/07/2021    GFRAA >60 05/07/2021    CALCIUM 9.4 05/07/2021    PROT 6.7 11/22/2019    LABALBU 4.4 11/22/2019    AGRATIO 1.7 07/09/2019    BILITOT 0.3 11/22/2019    ALKPHOS 97 11/22/2019    ALT 32 11/22/2019    AST 19 11/22/2019    GLOB 2.9 07/09/2019     URINALYSIS:  Lab Results   Component Value Date    GLUCOSEU Negative 11/22/2019    KETUA Negative 11/22/2019    SPECGRAV 1.029 11/22/2019    BLOODU Negative 11/22/2019    PHUR 6.0 11/22/2019    PROTEINU Negative 11/22/2019    NITRU Negative 11/22/2019    LEUKOCYTESUR Negative 11/22/2019    LABMICR Not Indicated 11/22/2019    Hina Iyer mouth once daily 90 tablet 0    lansoprazole (PREVACID) 30 MG delayed release capsule Take 1 capsule by mouth once daily 90 capsule 0    dilTIAZem (CARDIZEM CD) 360 MG extended release capsule TAKE 1 CAPSULE BY MOUTH ONE TIME A DAY 90 capsule 1    meloxicam (MOBIC) 15 MG tablet Take 1 tablet by mouth daily 30 tablet 3    citalopram (CELEXA) 40 MG tablet Take 1 tablet by mouth once daily 90 tablet 1    sildenafil (VIAGRA) 50 MG tablet Take 1 tablet by mouth daily as needed for Erectile Dysfunction 10 tablet 2    hydroCHLOROthiazide (HYDRODIURIL) 25 MG tablet Take 0.5 tablets by mouth every morning 45 tablet 3    dicyclomine (BENTYL) 10 MG capsule Take 1 capsule by mouth 4 times daily as needed (cramping) Per  capsule 1    aspirin EC 81 MG EC tablet Take 1 tablet by mouth daily 90 tablet 1    metoprolol tartrate (LOPRESSOR) 25 MG tablet Take 1 tablet by mouth 2 times daily 180 tablet 1    BiPAP Machine MISC by Does not apply route At least 5 hours       No current facility-administered medications for this visit. Return in about 4 months (around 11/30/2021). An After Visit Summary was printed and given to the patient. Documentation was done using voice recognition dragon software. Every effort was made to ensure accuracy; however, inadvertent  Unintentional computerized transcription errors may be present.

## 2021-08-23 ENCOUNTER — OFFICE VISIT (OUTPATIENT)
Dept: ORTHOPEDIC SURGERY | Age: 56
End: 2021-08-23
Payer: COMMERCIAL

## 2021-08-23 VITALS — RESPIRATION RATE: 16 BRPM | BODY MASS INDEX: 40.43 KG/M2 | HEIGHT: 74 IN | WEIGHT: 315 LBS

## 2021-08-23 DIAGNOSIS — M25.561 ACUTE PAIN OF RIGHT KNEE: ICD-10-CM

## 2021-08-23 DIAGNOSIS — M25.461 EFFUSION OF RIGHT KNEE: Primary | ICD-10-CM

## 2021-08-23 LAB
APPEARANCE FLUID: NORMAL
CELL COUNT FLUID TYPE: NORMAL
CLOT EVALUATION: NORMAL
COLOR FLUID: NORMAL
CRYSTALS, FLUID: NORMAL
EOSINOPHIL FLUID: 2 %
LYMPHOCYTES, BODY FLUID: 23 %
MACROPHAGE FLUID: 2 %
MONOCYTE, FLUID: 1 %
NEUTROPHIL, FLUID: 72 %
NUCLEATED CELLS FLUID: 451 /CUMM
NUMBER OF CELLS COUNTED FLUID: 100
RBC FLUID: NORMAL /CUMM
SOURCE BODY FLUID: NORMAL

## 2021-08-23 PROCEDURE — 99213 OFFICE O/P EST LOW 20 MIN: CPT | Performed by: ORTHOPAEDIC SURGERY

## 2021-08-23 NOTE — PROGRESS NOTES
CHIEF COMPLAINT: Right knee pain. History:   Nelson Herbert is a 54 y.o. male self-referred for evaluation and treatment of right knee pain / injury. The patient complains of right knee pain. This is evaluated as a personal injury. The pain began 3 weeks ago. Rate pain 7/10. There was not a history of injury. The pain is located anteriorly. It is worse with knee flexion. The knee has not given out or felt unstable. The patient can bend and straighten the knee fully. There is swelling in the knee. There was not catching / locking of the knee. The patient has not had PT. The patient has not had an injection. The patient has taken NSAIDs. The patient has tried ice. 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 He denies history of gout. Outside reports reviewed: none. Past Medical History:   Diagnosis Date    Adenomatous polyp of ascending colon     Next colonoscopy 2022. Sessile serrated adenoma, tubular adenoma Dr. Zacarias Prior Adenomatous polyp of transverse colon     Colonoscopy 2020. Multiple polypectomy    Anxiety     Atrial fibrillation (HCC)     Basal cell carcinoma of left temple region 12/20/2017    Colon polyp 12/09/2019    Poor Prep. Recommend repeat colonoscopy in 1 year with 2 days prep-Dr. Fanta Gonzales Fatty liver 12/05/2019    GERD (gastroesophageal reflux disease)     Hypertension     IBS (irritable bowel syndrome) 2017    Mixed hyperlipidemia 04/25/2017    Obstructive sleep apnea (adult) (pediatric)        Past Surgical History:   Procedure Laterality Date    ABLATION OF DYSRHYTHMIC FOCUS  10/2015    COLONOSCOPY  01/01/2011    COLONOSCOPY  05/24/2017    Dr. Shirley Copeland multiple polyps and the ascending colon and transverse colon. Status post multiple polypectomies and biopsies.  COLONOSCOPY N/A 12/09/2019    COLONOSCOPY POLYPECTOMY SNARE/COLD BIOPSY performed by Apryl Kim MD at [facility] COLONOSCOPY  01/22/2021    Multiple polypectomy.  Tubular adenomas Abused:     Physically Abused:     Sexually Abused:        Current Outpatient Medications   Medication Sig Dispense Refill    Multiple Vitamin (MULTI-VITAMIN DAILY PO) take 1 tablet by oral route  every day with food      Cyanocobalamin ER 1000 MCG TBCR Take by mouth      ascorbic acid (VITAMIN C) 1000 MG tablet Take by mouth      losartan (COZAAR) 100 MG tablet Take 1 tablet by mouth once daily 90 tablet 0    atorvastatin (LIPITOR) 40 MG tablet Take 1 tablet by mouth once daily 90 tablet 0    lansoprazole (PREVACID) 30 MG delayed release capsule Take 1 capsule by mouth once daily 90 capsule 0    dilTIAZem (CARDIZEM CD) 360 MG extended release capsule TAKE 1 CAPSULE BY MOUTH ONE TIME A DAY 90 capsule 1    meloxicam (MOBIC) 15 MG tablet Take 1 tablet by mouth daily 30 tablet 3    citalopram (CELEXA) 40 MG tablet Take 1 tablet by mouth once daily 90 tablet 1    sildenafil (VIAGRA) 50 MG tablet Take 1 tablet by mouth daily as needed for Erectile Dysfunction 10 tablet 2    hydroCHLOROthiazide (HYDRODIURIL) 25 MG tablet Take 0.5 tablets by mouth every morning 45 tablet 3    dicyclomine (BENTYL) 10 MG capsule Take 1 capsule by mouth 4 times daily as needed (cramping) Per  capsule 1    aspirin EC 81 MG EC tablet Take 1 tablet by mouth daily 90 tablet 1    metoprolol tartrate (LOPRESSOR) 25 MG tablet Take 1 tablet by mouth 2 times daily 180 tablet 1    BiPAP Machine MISC by Does not apply route At least 5 hours       No current facility-administered medications for this visit. No Known Allergies    Review of Systems:  I have reviewed the clinically relevant past medical history, medications, allergies, family history, social history, and 13 point Review of Systems from the patient's recent history form & documented any details relevant to today's presenting complaints in the history above.  The patient's self-reported past medical history, medications, allergies, family history, social history, and Review of Systems form from 5/19/21 have been scanned into the chart under the \"Media\" tab. Physical Examination:     Vital signs:   Resp 16   Ht 6' 2\" (1.88 m)   Wt (!) 344 lb (156 kg) Comment: scale broken  BMI 44.17 kg/m²     General:  alert, appears stated age, cooperative and no distress   Gait:  Normal. The patient can bear weight on the injured extremity. Right Knee  Alignment:  neutral   ROM:  0 degrees extension to 120 degrees flexion   Bilateral knees   Crepitus:  no   Joint Tenderness:  none   Effusion:   >60 cc   Patellar excursion:  2 of 4 quadrants    Patellar tilt test:  positive   Patellar facet tenderness:  negative medial   negative lateral   Patellar apprehension test:  negative   Lachman test:  negative   Left knee: negative   Anterior drawer test:  negative   Left knee: negative   Posterior drawer:   negative    Left knee: negative   Varus laxity at 30 degrees:  negative   Left knee: negative   Valgus laxity at 30 degrees:   negative   Left knee: negative   Kristian's test:  negative   Left knee: negative   There is mild warmth in his anterior knee. He does have 2+ pitting edema in his right lower extremity and 1+ pitting edema in his left lower extremity. He does have some petechiae in his bilateral lower legs. Motor exam of the lower extremities show quadriceps, hamstrings, foot dorsiflexion and plantarflexion grossly intact. Sensation to both feet is grossly intact to light touch. The bilateral lower extremities are warm and well-perfused with brisk capillary refill. Imaging:  Right Knee X-Ray: 3 view x-rays of the knee, including bilateral AP and sunrise and lateral of the affected side were obtained and reviewed. No fracture, dislocation, lytic lesion. Mild medial compartment narrowing       Assessment:     Right knee effusion  Morbid obesity  GERD  Sleep apnea  Anxiety  Irritable bowel syndrome      Plan:     Natural history and expected course discussed. Questions answered. Discussed that I am more suspicious for either gout or a rheumatologic/inflammatory disease. The risks and benefits of an aspiration were discussed with the patient. The patient had full opportunity to ask questions and all were answered. The patient then provided verbal informed consent. The skin was then prepped with betadine solution and alcohol. Under aseptic conditions, the  right knee was aspirated of approximately 20 cc blood-tinged fluid. Of note, I did encounter some clot initially as I was aspirating which likely led to the blood-tinged fluid. Fluid will be sent to the lab for crystal analysis and cell count. Patient will be sent downstairs for blood work for rheumatologic work-up. Continue ice as needed. Continue NSAIDs as needed. I do recommend follow-up with his PCP for his bilateral lower extremity pitting edema and petechiae. We will call him with the lab results to direct further care. Gabbie Hurtado. Anat Hilliard MD  Orthopaedic Surgery and Sports Medicine     Disclaimer: This note was generated with use of a verbal recognition program (DRAGON) and an attempt was made to check for errors. It is possible that there are still dictated errors within this office note. If so, please bring any significant errors to my attention for an addendum. All efforts were made to ensure that this office note is accurate.

## 2021-08-30 ENCOUNTER — OFFICE VISIT (OUTPATIENT)
Dept: INTERNAL MEDICINE CLINIC | Age: 56
End: 2021-08-30
Payer: COMMERCIAL

## 2021-08-30 VITALS
HEIGHT: 74 IN | TEMPERATURE: 97.4 F | SYSTOLIC BLOOD PRESSURE: 148 MMHG | BODY MASS INDEX: 40.43 KG/M2 | DIASTOLIC BLOOD PRESSURE: 88 MMHG | HEART RATE: 64 BPM | WEIGHT: 315 LBS | OXYGEN SATURATION: 97 %

## 2021-08-30 DIAGNOSIS — M25.461 KNEE EFFUSION, RIGHT: Primary | ICD-10-CM

## 2021-08-30 DIAGNOSIS — M17.11 PRIMARY OSTEOARTHRITIS OF RIGHT KNEE: ICD-10-CM

## 2021-08-30 DIAGNOSIS — M79.89 LEG SWELLING: ICD-10-CM

## 2021-08-30 PROCEDURE — 99214 OFFICE O/P EST MOD 30 MIN: CPT | Performed by: INTERNAL MEDICINE

## 2021-08-30 ASSESSMENT — ENCOUNTER SYMPTOMS
WHEEZING: 0
TROUBLE SWALLOWING: 0
SHORTNESS OF BREATH: 0
VOICE CHANGE: 0
ABDOMINAL PAIN: 0
NAUSEA: 0

## 2021-08-30 NOTE — PROGRESS NOTES
Bruno Parra  1965  male  54 y.o. SUBJECTIVE:       Chief Complaint   Patient presents with    Follow-up     Right knee, saw Ortho, finished steroids and had 40 cc drained, knee still hurts       HPI:  Patient has been complaining of sudden onset of pain and swelling affecting the right knee started 2 weeks ago. He cannot recall any injury. He was evaluated by orthopedic physician. Patient had knee aspiration. Joint fluid failed to show any evidence of crystal arthritis. Patient was started on prednisone and symptom has been improving significantly. During that office visit with orthopedic patient was told he might have few petechiae of the lower legs as well as edema. Currently he does not have any petechiae continue to have mild edema  Patient denies any photophobia, any other joint pain or swelling. He had no other signs symptoms of rheumatologic disease. Denies any focal neurologic deficit. He does history of recurrent bilateral leg swelling. In the past he was on Aldactone which was discontinued by cardiology. Past Medical History:   Diagnosis Date    Adenomatous polyp of ascending colon     Next colonoscopy 2022. Sessile serrated adenoma, tubular adenoma Dr. Evelyne Headley Adenomatous polyp of transverse colon     Colonoscopy 2020. Multiple polypectomy    Anxiety     Atrial fibrillation (HCC)     Basal cell carcinoma of left temple region 12/20/2017    Colon polyp 12/09/2019    Poor Prep.  Recommend repeat colonoscopy in 1 year with 2 days prep-Dr. Jovita Porras Fatty liver 12/05/2019    GERD (gastroesophageal reflux disease)     Hypertension     IBS (irritable bowel syndrome) 2017    Mixed hyperlipidemia 04/25/2017    Obstructive sleep apnea (adult) (pediatric)      Past Surgical History:   Procedure Laterality Date    ABLATION OF DYSRHYTHMIC FOCUS  10/2015    COLONOSCOPY  01/01/2011    COLONOSCOPY  05/24/2017    Dr. Nereida Stevens multiple polyps and the ascending colon and transverse colon.  Status post multiple polypectomies and biopsies.  COLONOSCOPY N/A 12/09/2019    COLONOSCOPY POLYPECTOMY SNARE/COLD BIOPSY performed by Tan Waterman MD at [address] COLONOSCOPY  01/22/2021    Multiple polypectomy. Tubular adenomas and sessile polyps. 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     Employer: [employer]   Tobacco Use    Smoking status: Current Some Day Smoker     Packs/day: 0.10     Types: Cigars     Start date: 1/1/2011    Smokeless tobacco: Never Used    Tobacco comment: 1 per month --[employer]IG[employer]   Vaping Use    Vaping Use: Never used   Substance and Sexual Activity    Alcohol use: No     Comment: occas (last drink March 2016)    Drug use: No    Sexual activity: None   Other Topics Concern    None   Social History Narrative    None     Social Determinants of Health     Financial Resource Strain: Low Risk     Difficulty of Paying Living Expenses: Not hard at all   Food Insecurity: No Food Insecurity    Worried About Running Out of Food in the Last Year: Never true    Kelsey of Food in the Last Year: Never true   Transportation Needs:     Lack of Transportation (Medical):      Lack of Transportation (Non-Medical):    Physical Activity:     Days of Exercise per Week:     Minutes of Exercise per Session:    Stress:     Feeling of Stress :    Social Connections:     Frequency of Communication with Friends and Family:     Frequency of Social Gatherings with Friends and Family:     Attends Mu-ism Services:     Active Member of Clubs or Organizations:     Attends Club or Organization Meetings:     Marital Status:    Intimate Partner Violence:     Fear of Current or Ex-Partner:     Emotionally Abused:     Physically Abused:     Sexually Abused:      Family History   Problem Relation Age of Onset    High Blood Pressure Mother     Stroke Mother     Other Mother         MICKIE    Heart Disease Neg Hx     High Cholesterol Neg Hx        Review of Systems   Constitutional: Negative for diaphoresis. HENT: Negative for trouble swallowing and voice change. Respiratory: Negative for shortness of breath and wheezing. Cardiovascular: Negative for chest pain and palpitations. Gastrointestinal: Negative for abdominal pain and nausea. Neurological: Negative for dizziness and light-headedness. OBJECTIVE:  Pulse Readings from Last 4 Encounters:   08/30/21 64   07/30/21 72   04/16/21 84   01/15/21 84     Wt Readings from Last 4 Encounters:   08/30/21 (!) 342 lb (155.1 kg)   08/23/21 (!) 344 lb (156 kg)   07/30/21 (!) 344 lb 6.4 oz (156.2 kg)   06/25/21 (!) 343 lb 6.4 oz (155.8 kg)     BP Readings from Last 4 Encounters:   08/30/21 (!) 148/88   07/30/21 136/82   06/25/21 (!) 162/99   04/16/21 (!) 142/92     Physical Exam  Vitals and nursing note reviewed. Constitutional:       Appearance: He is not ill-appearing. Cardiovascular:      Rate and Rhythm: Normal rate and regular rhythm. Pulses: Normal pulses. Heart sounds: Normal heart sounds. Pulmonary:      Effort: Pulmonary effort is normal.      Breath sounds: Normal breath sounds. Abdominal:      General: Bowel sounds are normal.   Musculoskeletal:      Comments: Significant knee crepitation on flexion extension. There is still mild puffiness of the right knee. Full knee flexion. Skin:     Findings: No bruising, erythema or rash. Neurological:      General: No focal deficit present. Mental Status: He is alert.          CBC:   Lab Results   Component Value Date    WBC 7.9 08/23/2021    HGB 14.0 08/23/2021    HCT 40.5 08/23/2021     08/23/2021     CMP:  Lab Results   Component Value Date     05/07/2021    K 4.1 05/07/2021     05/07/2021    CO2 25 05/07/2021    ANIONGAP 13 05/07/2021    GLUCOSE 129 05/07/2021    BUN 14 05/07/2021    CREATININE 1.0 05/07/2021    GFRAA >60 05/07/2021    CALCIUM 9.4 05/07/2021    PROT 6.7 11/22/2019    LABALBU 4.4 11/22/2019    AGRATIO 1.7 07/09/2019    BILITOT 0.3 11/22/2019    ALKPHOS 97 11/22/2019    ALT 32 11/22/2019    AST 19 11/22/2019    GLOB 2.9 07/09/2019     URINALYSIS:  Lab Results   Component Value Date    GLUCOSEU Negative 11/22/2019    KETUA Negative 11/22/2019    SPECGRAV 1.029 11/22/2019    BLOODU Negative 11/22/2019    PHUR 6.0 11/22/2019    PROTEINU Negative 11/22/2019    NITRU Negative 11/22/2019    LEUKOCYTESUR Negative 11/22/2019    LABMICR Not Indicated 11/22/2019    URINETYPE Cleancatch 11/22/2019     HBA1C:   Lab Results   Component Value Date    LABA1C 5.4 08/24/2018    .3 08/24/2018     MICRO/ALB:   Lab Results   Component Value Date    LABMICR Not Indicated 11/22/2019     LIPID:  Lab Results   Component Value Date    CHOL 216 05/25/2018    TRIG 136 05/25/2018    HDL 39 01/06/2021    LDLCALC 145 01/06/2021    LABVLDL 38 01/06/2021     TSH:   Lab Results   Component Value Date    TSHREFLEX 3.56 11/22/2019     PSA:   Lab Results   Component Value Date    PSA 0.86 11/22/2019        ASSESSMENT/PLAN:      Travon Guerin was seen today for follow-up. Diagnoses and all orders for this visit:    Knee effusion, right  Significant improvement  -     Lyme Disease AB Total W Rflx to IgG/ IgM; Future    Primary osteoarthritis of right knee  Most likely  Leg swelling  -     Urinalysis; Future  -     BASIC METABOLIC PANEL; Future    Patient is status post work-up by orthopedics about right knee pain and swelling. Symptoms improved significantly after prednisone. So far work-up for rheumatologic disease is negative.   Bilateral  petechiae has been resolved. He still have slight bilateral leg swelling   Patient blood pressure is slightly elevated however he is on prednisone. He will monitor his blood pressure if it persistently high and continue to have bilateral leg swelling we should consider him restarting Aldactone. His Aldactone was discontinued by cardiology.   I advised him to make a call to cardiology    Patient will get his pending lab including lipid profile and PSA today    Orders Placed This Encounter   Procedures    Urinalysis     Standing Status:   Future     Standing Expiration Date:   8/30/2022    Lyme Disease AB Total W Rflx to IgG/ IgM     Standing Status:   Future     Standing Expiration Date:   8/30/2022    BASIC METABOLIC PANEL     Standing Status:   Future     Standing Expiration Date:   8/30/2022     Current Outpatient Medications   Medication Sig Dispense Refill    Multiple Vitamin (MULTI-VITAMIN DAILY PO) take 1 tablet by oral route  every day with food      Cyanocobalamin ER 1000 MCG TBCR Take by mouth      ascorbic acid (VITAMIN C) 1000 MG tablet Take by mouth      losartan (COZAAR) 100 MG tablet Take 1 tablet by mouth once daily 90 tablet 0    atorvastatin (LIPITOR) 40 MG tablet Take 1 tablet by mouth once daily 90 tablet 0    lansoprazole (PREVACID) 30 MG delayed release capsule Take 1 capsule by mouth once daily 90 capsule 0    dilTIAZem (CARDIZEM CD) 360 MG extended release capsule TAKE 1 CAPSULE BY MOUTH ONE TIME A DAY 90 capsule 1    citalopram (CELEXA) 40 MG tablet Take 1 tablet by mouth once daily 90 tablet 1    sildenafil (VIAGRA) 50 MG tablet Take 1 tablet by mouth daily as needed for Erectile Dysfunction 10 tablet 2    hydroCHLOROthiazide (HYDRODIURIL) 25 MG tablet Take 0.5 tablets by mouth every morning 45 tablet 3    dicyclomine (BENTYL) 10 MG capsule Take 1 capsule by mouth 4 times daily as needed (cramping) Per  capsule 1    aspirin EC 81 MG EC tablet Take 1 tablet by mouth daily 90 tablet 1    metoprolol tartrate (LOPRESSOR) 25 MG tablet Take 1 tablet by mouth 2 times daily 180 tablet 1    BiPAP Machine MISC by Does not apply route At least 5 hours       No current facility-administered medications for this visit. Keep regular appointment as scheduled. An After Visit Summary was printed and given to the patient. Documentation was done using voice recognition dragon software. Every effort was made to ensure accuracy; however, inadvertent  Unintentional computerized transcription errors may be present.

## 2021-09-15 ENCOUNTER — TELEPHONE (OUTPATIENT)
Dept: PULMONOLOGY | Age: 56
End: 2021-09-15

## 2021-09-15 ENCOUNTER — VIRTUAL VISIT (OUTPATIENT)
Dept: PULMONOLOGY | Age: 56
End: 2021-09-15
Payer: COMMERCIAL

## 2021-09-15 DIAGNOSIS — I47.1 SVT (SUPRAVENTRICULAR TACHYCARDIA) (HCC): Chronic | ICD-10-CM

## 2021-09-15 DIAGNOSIS — I10 ESSENTIAL HYPERTENSION: ICD-10-CM

## 2021-09-15 DIAGNOSIS — E66.01 CLASS 2 SEVERE OBESITY DUE TO EXCESS CALORIES WITH SERIOUS COMORBIDITY IN ADULT, UNSPECIFIED BMI (HCC): ICD-10-CM

## 2021-09-15 DIAGNOSIS — G47.33 OBSTRUCTIVE SLEEP APNEA SYNDROME: Primary | ICD-10-CM

## 2021-09-15 PROCEDURE — 99204 OFFICE O/P NEW MOD 45 MIN: CPT | Performed by: NURSE PRACTITIONER

## 2021-09-15 ASSESSMENT — SLEEP AND FATIGUE QUESTIONNAIRES
ESS TOTAL SCORE: 0
HOW LIKELY ARE YOU TO NOD OFF OR FALL ASLEEP IN A CAR, WHILE STOPPED FOR A FEW MINUTES IN TRAFFIC: 0
HOW LIKELY ARE YOU TO NOD OFF OR FALL ASLEEP WHEN YOU ARE A PASSENGER IN A CAR FOR AN HOUR WITHOUT A BREAK: 0
HOW LIKELY ARE YOU TO NOD OFF OR FALL ASLEEP WHILE WATCHING TV: 0
HOW LIKELY ARE YOU TO NOD OFF OR FALL ASLEEP WHILE SITTING INACTIVE IN A PUBLIC PLACE: 0
HOW LIKELY ARE YOU TO NOD OFF OR FALL ASLEEP WHILE SITTING QUIETLY AFTER LUNCH WITHOUT ALCOHOL: 0
HOW LIKELY ARE YOU TO NOD OFF OR FALL ASLEEP WHILE SITTING AND READING: 0
HOW LIKELY ARE YOU TO NOD OFF OR FALL ASLEEP WHILE LYING DOWN TO REST IN THE AFTERNOON WHEN CIRCUMSTANCES PERMIT: 0
HOW LIKELY ARE YOU TO NOD OFF OR FALL ASLEEP WHILE SITTING AND TALKING TO SOMEONE: 0

## 2021-09-15 NOTE — PROGRESS NOTES
Juan Daniel Lomeli MD, FAASM, [facility]  Jovana Anderson, MSN, RN, [address] [address]  Dept: [phone]  Dept Fax: [phone]  Loc: [phone]    Subjective:     Patient ID: Richi Avilez is a 54 y.o. male. Chief Complaint   Patient presents with    Sleep Apnea       HPI:      Sleep Medicine Video Visit    Pursuant to the emergency declaration under the [address] [place] [address] [facility] authority and the [employer] Act this Telephone Visit was insisted, with patient's consent, to reduce the patient's risk of exposure to COVID-19 and provide continuity of care for an established patient. Services were provided through a synchronous discussion over a telephone and/or Video chat to substitute for in-person clinic visit, and coded as such. While patient is at home. Machine Modem/Download Info:                                        PAP Mask  Mask Type: Full Face mask     [place] - Total score: 0    Follow-up :     Last Visit : 2018      Subjective Health Changes: None      Over Night Oximetry: [] Yes  [] No  [x] NA [] WNL   Using O2: [] Yes  [] No  [x] NA   Patient is compliant with the machine  [] Yes  [] No [x] Per patient   Feeling rested when using the machine   [x] Yes  [] No     Pressure is comfortable with inspiration and expiration  [x] Yes  [] No   ([x] NA   [] Feeling of suffocation  [] Feeling like not enough air    [] To much pressure)     Noticed changes in pressure  [x] NA  [] Yes    [] No     Mask is fitting well  [x] Yes  [] No   Noting Mask Air Leak  [] Yes  [x] No   Having painful Aerophagia  [] Yes  [x] No   Nocturia   1  per night.    Having  HA upon waking  [] Yes  [x] No   Dry mouth upon waking   Dry Nose  Dry Eyes  [x] Yes  [] No   Congestion upon waking   [] Yes  [x] No    Nose Bleeds  [] Yes  [x] No   Using Sleep Aides  [x] NA  [] OTC  [] Per our office   [] Per another provider   Understands how to change humidification and/or tubing temperature for comfort while at home  [x] Yes  [] No     Difficulties falling asleep  [] Yes  [x] No   Difficulties staying asleep  [] Yes  [x] No   Approximate time to bed  10-11pm   Approximate wake time  6am   Taking Naps  occasional   If taking naps usual length  1 hour   If taking naps using the machine [] NA  [x] Yes    [] No    [] With and With out    Drowsy when driving  [] Yes  [x] No     Does patient carry a DOT/CDL  [] Yes  [x] No     Does patient carry FAA/[employer] License   [] Yes  [x] No      Any concerns noted with the machine at this time  [] Yes  [x] No       Assessment/Plan:     1. Obstructive sleep apnea syndrome  Assessment & Plan:  Unable to Review compliance download with pt. Supplies and parts as needed for his machine. These are medically necessary. Continue medications per his PCP and other physicians. Limit caffeine use after 3pm.    The chronic medical conditions listed are directly related to the primary diagnosis listed above. The management of the primary diagnosis affects the secondary diagnosis and vice versa    Per patient patient IS compliant. At this time I am unable to assess this, and this could increase the risk of heart attacks, strokes, car accidents and/or death if not using the machine nightly, and with each sleep. 2. SVT (supraventricular tachycardia) (HCC)  Assessment & Plan:  Chronic- stable. After speaking with patient:    Agree with current plan, and would agree to continue this plan per prescribing and managing physician. 3. Class 2 severe obesity due to excess calories with serious comorbidity in adult, unspecified BMI ([address]  Assessment & Plan:  Patient encouraged to work on maintaining a healthy weight per height.   Achievable with diet restriction/modifications and exercise (may consult primary care if unsure of any restrictions or concerns). Weight management directly correlates to risk of control and maintenance of Obstructive Sleep Apnea. 4. Essential hypertension  Assessment & Plan:  Chronic- Stable . After speaking with patient:    Agree with current plan, and would agree to continue this plan per prescribing and managing physician. The chronic medical conditions listed are directly related to the primary diagnosis listed above. The management of the primary diagnosis affects the secondary diagnosis and vice versa. - After pulling data and reviewing it   - Unable Educate patient and reviewed down load from modem with the patient   - Continue medications per his PCP and other physicians.   - he instructed not to drive unless had 4 hrs of effective therapy for his MICKIE the night before. - Did review the risks of under or untreated MICKIE including, but not limited to, higher risks of motor vehicle accidents, stroke, heart attacks, and death. - he understands and accepts all these risks. - The patient is advised to use the machine every night.   - Patient using  [employer] for supplies  - Patient educated on   The potential need to take the machine to the DME to trouble shoot, to allow the office to get a download  Office locations  Compliance  Follow up  After speaking to the patient, patient is currently stable.  We will continue with the current machine settings  Recall Information and DME contact    -Will follow up in office in 12 months      Electronically signed by DAVID Eaton CNP on 9/15/2021 at 10:38 AM

## 2021-09-15 NOTE — ASSESSMENT & PLAN NOTE
Chronic- Stable . After speaking with patient:    Agree with current plan, and would agree to continue this plan per prescribing and managing physician.

## 2021-09-15 NOTE — PROGRESS NOTES
Mirta Mukherjee         : 1965    Diagnosis: [x] MICKIE (G47.33) [] CSA (G47.31) [] Apnea (G47.30)   Length of Need: [x] 12 Months [] 99 Months [] Other:    Machine (MARYCRUZ!): [] Respironics Dream Station   2   Auto [] ResMed AirSense     Auto [] Other:     []  CPAP () [] Bilevel ()   Mode: [] Auto [] Spontaneous    Mode: [] Auto [] Spontaneous            Comfort Settings:   - Ramp Pressure:  cmH2O                                        - Ramp time: 15 min                                     -  Flex/EPR - 3 full time                                    - For ResMed Bilevel (TiMax-4 sec   TiMin- 0.2 sec)     Humidifier: [x] Heated ()        [x] Water chamber replacement ()/ 1 per 6 months        Mask:   [] Nasal () /1 per 3 months [x] Full Face () /1 per 3 months   [] Patient choice -Size and fit mask [x] Patient Choice - Size and fit mask   [] Dispense:  [] Dispense:    [] Headgear () / 1 per 3 months [x] Headgear () / 1 per 3 months   [] Replacement Nasal Cushion ()/2 per month [x] Interface Replacement ()/1 per month   [] Replacement Nasal Pillows ()/2 per month         Tubing: [x] Heated ()/1 per 3 months    [] Standard ()/1 per 3 months [] Other:           Filters: [x] Non-disposable ()/1 per 6 months     [x] Ultra-Fine, Disposable ()/2 per month        Miscellaneous: [] Chin Strap ()/ 1 per 6 months [] O2 bleed-in:       LPM   [] Oximetry on CPAP/Bilevel []  Other:    [x] Modem: ()         Start Order Date: 09/15/21    MEDICAL JUSTIFICATION:  I, the undersigned, certify that the above prescribed supplies are medically necessary for this patients wellbeing. In my opinion, the supplies are both reasonable and necessary in reference to accepted standards of medicalpractice in treatment of this patients condition.     DAVID Barrios CNP      NPI: [id number]       Order Signed Date: 09/15/21    Electronically signed by Jai Avendaño, APRN - CNP on 9/15/2021 at 10:41 AM    Arcelia Victoria  1965  [address]  [phone] (home)   [phone] (mobile)      Insurance Info (confirm with patient if correct):  Payor/Plan Subscr  Sex Relation Sub.  Ins. ID Effective Group Num

## 2021-09-23 DIAGNOSIS — F41.1 GENERALIZED ANXIETY DISORDER: ICD-10-CM

## 2021-09-23 DIAGNOSIS — R10.13 EPIGASTRIC PAIN: ICD-10-CM

## 2021-09-24 RX ORDER — CITALOPRAM 40 MG/1
40 TABLET ORAL DAILY
Qty: 90 TABLET | Refills: 1 | Status: SHIPPED | OUTPATIENT
Start: 2021-09-24

## 2021-09-24 RX ORDER — LANSOPRAZOLE 30 MG/1
30 CAPSULE, DELAYED RELEASE ORAL DAILY
Qty: 90 CAPSULE | Refills: 1 | Status: SHIPPED | OUTPATIENT
Start: 2021-09-24

## 2021-09-24 NOTE — TELEPHONE ENCOUNTER
Requested Prescriptions     Pending Prescriptions Disp Refills    citalopram (CELEXA) 40 MG tablet 90 tablet 1     Last ov: 08/30/2021  Next ov: 11/30/2021

## 2021-09-24 NOTE — TELEPHONE ENCOUNTER
Requested Prescriptions     Pending Prescriptions Disp Refills    lansoprazole (PREVACID) 30 MG delayed release capsule 90 capsule 0     Last ov: 08/30/2021  Next ov: 11/30/2021

## 2021-09-30 NOTE — TELEPHONE ENCOUNTER
Received refill request for  Metoprolol from [facility].     Last ov: 2021 NPSR    Last EK2021    Last Refill: 10/15/2020 #180 w/ 1 refill    Next appointment: 2021

## (undated) DEVICE — PROCEDURE KIT ENDOSCP CUST

## (undated) DEVICE — SOLUTION IV IRRIG WATER 500ML POUR BRL ST 2F7113

## (undated) DEVICE — TRAP SPEC RETRV CLR PLAS POLYP IN LN SUCT QUIK CTCH

## (undated) DEVICE — SET VLV 3 PC AWS DISPOSABLE GRDIAN SCOPEVALET

## (undated) DEVICE — Device: Brand: DISPOSABLE ELECTROSURGICAL SNARE

## (undated) DEVICE — BW-412T DISP COMBO CLEANING BRUSH: Brand: SINGLE USE COMBINATION CLEANING BRUSH